# Patient Record
Sex: FEMALE | Race: WHITE | NOT HISPANIC OR LATINO
[De-identification: names, ages, dates, MRNs, and addresses within clinical notes are randomized per-mention and may not be internally consistent; named-entity substitution may affect disease eponyms.]

---

## 2017-05-24 PROBLEM — Z00.00 ENCOUNTER FOR PREVENTIVE HEALTH EXAMINATION: Status: ACTIVE | Noted: 2017-05-24

## 2017-06-13 ENCOUNTER — APPOINTMENT (OUTPATIENT)
Dept: ANTEPARTUM | Facility: CLINIC | Age: 32
End: 2017-06-13

## 2017-06-13 ENCOUNTER — LABORATORY RESULT (OUTPATIENT)
Age: 32
End: 2017-06-13

## 2017-06-13 ENCOUNTER — ASOB RESULT (OUTPATIENT)
Age: 32
End: 2017-06-13

## 2017-08-03 ENCOUNTER — APPOINTMENT (OUTPATIENT)
Dept: ANTEPARTUM | Facility: CLINIC | Age: 32
End: 2017-08-03
Payer: COMMERCIAL

## 2017-08-03 ENCOUNTER — ASOB RESULT (OUTPATIENT)
Age: 32
End: 2017-08-03

## 2017-08-03 PROCEDURE — 76811 OB US DETAILED SNGL FETUS: CPT

## 2017-09-03 ENCOUNTER — TRANSCRIPTION ENCOUNTER (OUTPATIENT)
Age: 32
End: 2017-09-03

## 2017-09-28 ENCOUNTER — ASOB RESULT (OUTPATIENT)
Age: 32
End: 2017-09-28

## 2017-09-28 ENCOUNTER — APPOINTMENT (OUTPATIENT)
Dept: ANTEPARTUM | Facility: CLINIC | Age: 32
End: 2017-09-28
Payer: COMMERCIAL

## 2017-09-28 PROCEDURE — 76817 TRANSVAGINAL US OBSTETRIC: CPT

## 2017-09-28 PROCEDURE — 76816 OB US FOLLOW-UP PER FETUS: CPT

## 2017-10-27 ENCOUNTER — ASOB RESULT (OUTPATIENT)
Age: 32
End: 2017-10-27

## 2017-10-27 ENCOUNTER — APPOINTMENT (OUTPATIENT)
Dept: ANTEPARTUM | Facility: CLINIC | Age: 32
End: 2017-10-27
Payer: COMMERCIAL

## 2017-10-27 PROCEDURE — 76816 OB US FOLLOW-UP PER FETUS: CPT

## 2017-11-30 ENCOUNTER — ASOB RESULT (OUTPATIENT)
Age: 32
End: 2017-11-30

## 2017-11-30 ENCOUNTER — APPOINTMENT (OUTPATIENT)
Dept: ANTEPARTUM | Facility: CLINIC | Age: 32
End: 2017-11-30
Payer: COMMERCIAL

## 2017-11-30 PROCEDURE — 76816 OB US FOLLOW-UP PER FETUS: CPT

## 2017-11-30 PROCEDURE — 76819 FETAL BIOPHYS PROFIL W/O NST: CPT

## 2017-12-20 ENCOUNTER — ASOB RESULT (OUTPATIENT)
Age: 32
End: 2017-12-20

## 2017-12-20 ENCOUNTER — APPOINTMENT (OUTPATIENT)
Dept: ANTEPARTUM | Facility: CLINIC | Age: 32
End: 2017-12-20
Payer: COMMERCIAL

## 2017-12-20 PROCEDURE — 76816 OB US FOLLOW-UP PER FETUS: CPT

## 2017-12-27 ENCOUNTER — OUTPATIENT (OUTPATIENT)
Dept: OUTPATIENT SERVICES | Facility: HOSPITAL | Age: 32
LOS: 1 days | End: 2017-12-27
Payer: COMMERCIAL

## 2017-12-27 ENCOUNTER — APPOINTMENT (OUTPATIENT)
Dept: ANTEPARTUM | Facility: CLINIC | Age: 32
End: 2017-12-27
Payer: COMMERCIAL

## 2017-12-27 ENCOUNTER — INPATIENT (INPATIENT)
Facility: HOSPITAL | Age: 32
LOS: 1 days | Discharge: HOME | End: 2017-12-29
Attending: OBSTETRICS & GYNECOLOGY | Admitting: OBSTETRICS & GYNECOLOGY
Payer: COMMERCIAL

## 2017-12-27 ENCOUNTER — ASOB RESULT (OUTPATIENT)
Age: 32
End: 2017-12-27

## 2017-12-27 VITALS
OXYGEN SATURATION: 98 % | TEMPERATURE: 99 F | SYSTOLIC BLOOD PRESSURE: 113 MMHG | RESPIRATION RATE: 18 BRPM | HEART RATE: 94 BPM | DIASTOLIC BLOOD PRESSURE: 55 MMHG

## 2017-12-27 DIAGNOSIS — Z34.80 ENCOUNTER FOR SUPERVISION OF OTHER NORMAL PREGNANCY, UNSPECIFIED TRIMESTER: ICD-10-CM

## 2017-12-27 DIAGNOSIS — Z3A.00 WEEKS OF GESTATION OF PREGNANCY NOT SPECIFIED: ICD-10-CM

## 2017-12-27 DIAGNOSIS — O26.899 OTHER SPECIFIED PREGNANCY RELATED CONDITIONS, UNSPECIFIED TRIMESTER: ICD-10-CM

## 2017-12-27 DIAGNOSIS — Z01.818 ENCOUNTER FOR OTHER PREPROCEDURAL EXAMINATION: ICD-10-CM

## 2017-12-27 LAB
BASOPHILS # BLD AUTO: 0 K/UL — SIGNIFICANT CHANGE UP (ref 0–0.2)
BASOPHILS NFR BLD AUTO: 0.3 % — SIGNIFICANT CHANGE UP (ref 0–2)
BLD GP AB SCN SERPL QL: NEGATIVE — SIGNIFICANT CHANGE UP
EOSINOPHIL # BLD AUTO: 0.2 K/UL — SIGNIFICANT CHANGE UP (ref 0–0.5)
EOSINOPHIL NFR BLD AUTO: 1.5 % — SIGNIFICANT CHANGE UP (ref 0–6)
GLUCOSE BLDC GLUCOMTR-MCNC: 143 MG/DL — HIGH (ref 70–99)
GLUCOSE BLDC GLUCOMTR-MCNC: 82 MG/DL — SIGNIFICANT CHANGE UP (ref 70–99)
GLUCOSE BLDC GLUCOMTR-MCNC: 98 MG/DL — SIGNIFICANT CHANGE UP (ref 70–99)
HCT VFR BLD CALC: 40.5 % — SIGNIFICANT CHANGE UP (ref 34.5–45)
HGB BLD-MCNC: 13.9 G/DL — SIGNIFICANT CHANGE UP (ref 11.5–15.5)
LYMPHOCYTES # BLD AUTO: 18 % — SIGNIFICANT CHANGE UP (ref 13–44)
LYMPHOCYTES # BLD AUTO: 2.2 K/UL — SIGNIFICANT CHANGE UP (ref 1–3.3)
MCHC RBC-ENTMCNC: 31 PG — SIGNIFICANT CHANGE UP (ref 27–34)
MCHC RBC-ENTMCNC: 34.4 GM/DL — SIGNIFICANT CHANGE UP (ref 32–36)
MCV RBC AUTO: 90.2 FL — SIGNIFICANT CHANGE UP (ref 80–100)
MONOCYTES # BLD AUTO: 0.6 K/UL — SIGNIFICANT CHANGE UP (ref 0–0.9)
MONOCYTES NFR BLD AUTO: 5 % — SIGNIFICANT CHANGE UP (ref 2–14)
NEUTROPHILS # BLD AUTO: 9.2 K/UL — HIGH (ref 1.8–7.4)
NEUTROPHILS NFR BLD AUTO: 75.2 % — SIGNIFICANT CHANGE UP (ref 43–77)
PLATELET # BLD AUTO: 220 K/UL — SIGNIFICANT CHANGE UP (ref 150–400)
RBC # BLD: 4.48 M/UL — SIGNIFICANT CHANGE UP (ref 3.8–5.2)
RBC # FLD: 12.1 % — SIGNIFICANT CHANGE UP (ref 10.3–14.5)
RH IG SCN BLD-IMP: NEGATIVE — SIGNIFICANT CHANGE UP
RH IG SCN BLD-IMP: NEGATIVE — SIGNIFICANT CHANGE UP
T PALLIDUM AB TITR SER: NEGATIVE — SIGNIFICANT CHANGE UP
WBC # BLD: 12.2 K/UL — HIGH (ref 3.8–10.5)
WBC # FLD AUTO: 12.2 K/UL — HIGH (ref 3.8–10.5)

## 2017-12-27 PROCEDURE — 59025 FETAL NON-STRESS TEST: CPT

## 2017-12-27 PROCEDURE — 59025 FETAL NON-STRESS TEST: CPT | Mod: 26

## 2017-12-27 RX ORDER — DIBUCAINE 1 %
1 OINTMENT (GRAM) RECTAL EVERY 4 HOURS
Qty: 0 | Refills: 0 | Status: DISCONTINUED | OUTPATIENT
Start: 2017-12-27 | End: 2017-12-27

## 2017-12-27 RX ORDER — IBUPROFEN 200 MG
600 TABLET ORAL EVERY 6 HOURS
Qty: 0 | Refills: 0 | Status: COMPLETED | OUTPATIENT
Start: 2017-12-27 | End: 2018-11-25

## 2017-12-27 RX ORDER — OXYTOCIN 10 UNIT/ML
333.33 VIAL (ML) INJECTION
Qty: 20 | Refills: 0 | Status: DISCONTINUED | OUTPATIENT
Start: 2017-12-27 | End: 2017-12-27

## 2017-12-27 RX ORDER — SODIUM CHLORIDE 9 MG/ML
1000 INJECTION INTRAMUSCULAR; INTRAVENOUS; SUBCUTANEOUS
Qty: 0 | Refills: 0 | Status: DISCONTINUED | OUTPATIENT
Start: 2017-12-27 | End: 2017-12-27

## 2017-12-27 RX ORDER — DIPHENHYDRAMINE HCL 50 MG
25 CAPSULE ORAL EVERY 6 HOURS
Qty: 0 | Refills: 0 | Status: DISCONTINUED | OUTPATIENT
Start: 2017-12-27 | End: 2017-12-29

## 2017-12-27 RX ORDER — HYDROCORTISONE 1 %
1 OINTMENT (GRAM) TOPICAL EVERY 4 HOURS
Qty: 0 | Refills: 0 | Status: DISCONTINUED | OUTPATIENT
Start: 2017-12-27 | End: 2017-12-29

## 2017-12-27 RX ORDER — LANOLIN
1 OINTMENT (GRAM) TOPICAL EVERY 6 HOURS
Qty: 0 | Refills: 0 | Status: DISCONTINUED | OUTPATIENT
Start: 2017-12-27 | End: 2017-12-29

## 2017-12-27 RX ORDER — SODIUM CHLORIDE 9 MG/ML
3 INJECTION INTRAMUSCULAR; INTRAVENOUS; SUBCUTANEOUS EVERY 8 HOURS
Qty: 0 | Refills: 0 | Status: DISCONTINUED | OUTPATIENT
Start: 2017-12-27 | End: 2017-12-27

## 2017-12-27 RX ORDER — PRAMOXINE HYDROCHLORIDE 150 MG/15G
1 AEROSOL, FOAM RECTAL EVERY 4 HOURS
Qty: 0 | Refills: 0 | Status: DISCONTINUED | OUTPATIENT
Start: 2017-12-27 | End: 2017-12-27

## 2017-12-27 RX ORDER — SODIUM CHLORIDE 9 MG/ML
500 INJECTION, SOLUTION INTRAVENOUS ONCE
Qty: 0 | Refills: 0 | Status: DISCONTINUED | OUTPATIENT
Start: 2017-12-27 | End: 2017-12-27

## 2017-12-27 RX ORDER — SODIUM CHLORIDE 9 MG/ML
500 INJECTION INTRAMUSCULAR; INTRAVENOUS; SUBCUTANEOUS
Qty: 0 | Refills: 0 | Status: DISCONTINUED | OUTPATIENT
Start: 2017-12-27 | End: 2017-12-27

## 2017-12-27 RX ORDER — PRAMOXINE HYDROCHLORIDE 150 MG/15G
1 AEROSOL, FOAM RECTAL EVERY 4 HOURS
Qty: 0 | Refills: 0 | Status: DISCONTINUED | OUTPATIENT
Start: 2017-12-27 | End: 2017-12-29

## 2017-12-27 RX ORDER — OXYTOCIN 10 UNIT/ML
41.67 VIAL (ML) INJECTION
Qty: 20 | Refills: 0 | Status: DISCONTINUED | OUTPATIENT
Start: 2017-12-27 | End: 2017-12-29

## 2017-12-27 RX ORDER — MAGNESIUM HYDROXIDE 400 MG/1
30 TABLET, CHEWABLE ORAL
Qty: 0 | Refills: 0 | Status: DISCONTINUED | OUTPATIENT
Start: 2017-12-27 | End: 2017-12-29

## 2017-12-27 RX ORDER — GLYCERIN ADULT
1 SUPPOSITORY, RECTAL RECTAL AT BEDTIME
Qty: 0 | Refills: 0 | Status: DISCONTINUED | OUTPATIENT
Start: 2017-12-27 | End: 2017-12-29

## 2017-12-27 RX ORDER — ACETAMINOPHEN 500 MG
975 TABLET ORAL EVERY 6 HOURS
Qty: 0 | Refills: 0 | Status: COMPLETED | OUTPATIENT
Start: 2017-12-27 | End: 2018-11-25

## 2017-12-27 RX ORDER — AER TRAVELER 0.5 G/1
1 SOLUTION RECTAL; TOPICAL EVERY 4 HOURS
Qty: 0 | Refills: 0 | Status: DISCONTINUED | OUTPATIENT
Start: 2017-12-27 | End: 2017-12-27

## 2017-12-27 RX ORDER — SODIUM CHLORIDE 9 MG/ML
1000 INJECTION, SOLUTION INTRAVENOUS
Qty: 0 | Refills: 0 | Status: DISCONTINUED | OUTPATIENT
Start: 2017-12-27 | End: 2017-12-27

## 2017-12-27 RX ORDER — INFLUENZA VIRUS VACCINE 15; 15; 15; 15 UG/.5ML; UG/.5ML; UG/.5ML; UG/.5ML
0.5 SUSPENSION INTRAMUSCULAR ONCE
Qty: 0 | Refills: 0 | Status: DISCONTINUED | OUTPATIENT
Start: 2017-12-27 | End: 2017-12-27

## 2017-12-27 RX ORDER — OXYTOCIN 10 UNIT/ML
4 VIAL (ML) INJECTION
Qty: 30 | Refills: 0 | Status: DISCONTINUED | OUTPATIENT
Start: 2017-12-27 | End: 2017-12-29

## 2017-12-27 RX ORDER — AER TRAVELER 0.5 G/1
1 SOLUTION RECTAL; TOPICAL EVERY 4 HOURS
Qty: 0 | Refills: 0 | Status: DISCONTINUED | OUTPATIENT
Start: 2017-12-27 | End: 2017-12-29

## 2017-12-27 RX ORDER — DIBUCAINE 1 %
1 OINTMENT (GRAM) RECTAL EVERY 4 HOURS
Qty: 0 | Refills: 0 | Status: DISCONTINUED | OUTPATIENT
Start: 2017-12-27 | End: 2017-12-29

## 2017-12-27 RX ORDER — SIMETHICONE 80 MG/1
80 TABLET, CHEWABLE ORAL EVERY 6 HOURS
Qty: 0 | Refills: 0 | Status: DISCONTINUED | OUTPATIENT
Start: 2017-12-27 | End: 2017-12-29

## 2017-12-27 RX ORDER — OXYTOCIN 10 UNIT/ML
41.67 VIAL (ML) INJECTION
Qty: 20 | Refills: 0 | Status: DISCONTINUED | OUTPATIENT
Start: 2017-12-27 | End: 2017-12-27

## 2017-12-27 RX ORDER — TETANUS TOXOID, REDUCED DIPHTHERIA TOXOID AND ACELLULAR PERTUSSIS VACCINE, ADSORBED 5; 2.5; 8; 8; 2.5 [IU]/.5ML; [IU]/.5ML; UG/.5ML; UG/.5ML; UG/.5ML
0.5 SUSPENSION INTRAMUSCULAR ONCE
Qty: 0 | Refills: 0 | Status: DISCONTINUED | OUTPATIENT
Start: 2017-12-27 | End: 2017-12-29

## 2017-12-27 RX ORDER — SODIUM CHLORIDE 9 MG/ML
3 INJECTION INTRAMUSCULAR; INTRAVENOUS; SUBCUTANEOUS EVERY 8 HOURS
Qty: 0 | Refills: 0 | Status: DISCONTINUED | OUTPATIENT
Start: 2017-12-27 | End: 2017-12-29

## 2017-12-27 RX ORDER — DOCUSATE SODIUM 100 MG
100 CAPSULE ORAL
Qty: 0 | Refills: 0 | Status: DISCONTINUED | OUTPATIENT
Start: 2017-12-27 | End: 2017-12-29

## 2017-12-27 RX ORDER — OXYCODONE HYDROCHLORIDE 5 MG/1
5 TABLET ORAL EVERY 4 HOURS
Qty: 0 | Refills: 0 | Status: DISCONTINUED | OUTPATIENT
Start: 2017-12-27 | End: 2017-12-29

## 2017-12-27 RX ORDER — HYDROCORTISONE 1 %
1 OINTMENT (GRAM) TOPICAL EVERY 4 HOURS
Qty: 0 | Refills: 0 | Status: DISCONTINUED | OUTPATIENT
Start: 2017-12-27 | End: 2017-12-27

## 2017-12-27 RX ORDER — OXYCODONE HYDROCHLORIDE 5 MG/1
5 TABLET ORAL
Qty: 0 | Refills: 0 | Status: DISCONTINUED | OUTPATIENT
Start: 2017-12-27 | End: 2017-12-29

## 2017-12-27 RX ORDER — KETOROLAC TROMETHAMINE 30 MG/ML
30 SYRINGE (ML) INJECTION ONCE
Qty: 0 | Refills: 0 | Status: DISCONTINUED | OUTPATIENT
Start: 2017-12-27 | End: 2017-12-27

## 2017-12-27 RX ORDER — CITRIC ACID/SODIUM CITRATE 300-500 MG
15 SOLUTION, ORAL ORAL EVERY 4 HOURS
Qty: 0 | Refills: 0 | Status: DISCONTINUED | OUTPATIENT
Start: 2017-12-27 | End: 2017-12-27

## 2017-12-27 RX ORDER — OXYTOCIN 10 UNIT/ML
4 VIAL (ML) INJECTION
Qty: 30 | Refills: 0 | Status: DISCONTINUED | OUTPATIENT
Start: 2017-12-27 | End: 2017-12-27

## 2017-12-27 RX ADMIN — Medication 30 MILLIGRAM(S): at 18:08

## 2017-12-27 RX ADMIN — Medication 4 MILLIUNIT(S)/MIN: at 12:10

## 2017-12-27 RX ADMIN — Medication 4 MILLIUNIT(S)/MIN: at 11:19

## 2017-12-27 RX ADMIN — SODIUM CHLORIDE 3 MILLILITER(S): 9 INJECTION INTRAMUSCULAR; INTRAVENOUS; SUBCUTANEOUS at 22:00

## 2017-12-27 RX ADMIN — Medication 30 MILLIGRAM(S): at 17:42

## 2017-12-28 LAB
HCT VFR BLD CALC: 32 % — LOW (ref 34.5–45)
HGB BLD-MCNC: 10.9 G/DL — LOW (ref 11.5–15.5)
KLEIHAUER-BETKE CALCULATION: 0 % — SIGNIFICANT CHANGE UP (ref 0–0.3)

## 2017-12-28 RX ORDER — ACETAMINOPHEN 500 MG
975 TABLET ORAL EVERY 6 HOURS
Qty: 0 | Refills: 0 | Status: DISCONTINUED | OUTPATIENT
Start: 2017-12-28 | End: 2017-12-29

## 2017-12-28 RX ORDER — IBUPROFEN 200 MG
600 TABLET ORAL EVERY 6 HOURS
Qty: 0 | Refills: 0 | Status: DISCONTINUED | OUTPATIENT
Start: 2017-12-28 | End: 2017-12-29

## 2017-12-28 RX ADMIN — Medication 600 MILLIGRAM(S): at 17:04

## 2017-12-28 RX ADMIN — Medication 600 MILLIGRAM(S): at 06:05

## 2017-12-28 RX ADMIN — Medication 1 TABLET(S): at 13:33

## 2017-12-28 RX ADMIN — Medication 100 MILLIGRAM(S): at 06:05

## 2017-12-28 RX ADMIN — Medication 600 MILLIGRAM(S): at 07:00

## 2017-12-28 RX ADMIN — Medication 975 MILLIGRAM(S): at 17:05

## 2017-12-28 RX ADMIN — Medication 975 MILLIGRAM(S): at 06:04

## 2017-12-28 RX ADMIN — Medication 975 MILLIGRAM(S): at 16:35

## 2017-12-28 RX ADMIN — Medication 975 MILLIGRAM(S): at 07:00

## 2017-12-28 RX ADMIN — Medication 100 MILLIGRAM(S): at 17:55

## 2017-12-28 RX ADMIN — Medication 600 MILLIGRAM(S): at 16:34

## 2017-12-28 NOTE — DIETITIAN INITIAL EVALUATION ADULT. - OTHER INFO
Nutrition consult received for GDM after delivery nutrition education. Pt is GDMA1 S/P . Pt reports good appetite/intake of regular diet at this time. Denies GI distress. NKFA. Reports taking prenatal MVI PTA. Pt with plans to breastfeed exclusively. Pt's questions regarding diet addressed. Nutrition education provided and noted below.

## 2017-12-28 NOTE — DIETITIAN INITIAL EVALUATION ADULT. - NS AS NUTRI INTERV ED CONTENT
Pt educated on postpartum dietary recommendations, including importance of DM fasting blood glucose screening 4-12 weeks postpartum, risk of developing T2DM, ways to reduce risk, importance of prenatal MVI while breastfeeding, and increased nutrient needs for lactation. Pt verbalized understanding and accepted written handout.

## 2017-12-28 NOTE — PROGRESS NOTE ADULT - SUBJECTIVE AND OBJECTIVE BOX
PPD#1- ATTENDING NOTE    S: Patient doing well. Minimal lochia. Pain controlled.    O: Vital Signs Last 24 Hrs  T(C): 36.9 (28 Dec 2017 09:19), Max: 37.1 (27 Dec 2017 17:25)  T(F): 98.4 (28 Dec 2017 09:19), Max: 98.8 (27 Dec 2017 17:25)  HR: 96 (28 Dec 2017 09:19) (91 - 120)  BP: 115/79 (28 Dec 2017 09:19) (112/72 - 133/74)  BP(mean): --  RR: 18 (28 Dec 2017 09:19) (17 - 18)  SpO2: 97% (28 Dec 2017 09:19) (97% - 99%)    Gen: NAD  Abd: soft, NT, ND, fundus firm below umbilicus  Lochia: moderate  Ext: no tenderness, no hyper reflexia,     Labs:                            10.9   x     )-----------( x        ( 28 Dec 2017 06:39 )             32.0       A: 32y PPD# s/p  doing well.    Plan:  Analgesia prn  Regular diet  Follow up am labs  Routine post partum care

## 2017-12-29 ENCOUNTER — TRANSCRIPTION ENCOUNTER (OUTPATIENT)
Age: 32
End: 2017-12-29

## 2017-12-29 VITALS
DIASTOLIC BLOOD PRESSURE: 78 MMHG | HEART RATE: 89 BPM | TEMPERATURE: 98 F | OXYGEN SATURATION: 98 % | RESPIRATION RATE: 18 BRPM | SYSTOLIC BLOOD PRESSURE: 112 MMHG

## 2017-12-29 PROCEDURE — 86901 BLOOD TYPING SEROLOGIC RH(D): CPT

## 2017-12-29 PROCEDURE — 86780 TREPONEMA PALLIDUM: CPT

## 2017-12-29 PROCEDURE — 82962 GLUCOSE BLOOD TEST: CPT

## 2017-12-29 PROCEDURE — 59050 FETAL MONITOR W/REPORT: CPT

## 2017-12-29 PROCEDURE — 86900 BLOOD TYPING SEROLOGIC ABO: CPT

## 2017-12-29 PROCEDURE — 86850 RBC ANTIBODY SCREEN: CPT

## 2017-12-29 PROCEDURE — 85460 HEMOGLOBIN FETAL: CPT

## 2017-12-29 PROCEDURE — 59025 FETAL NON-STRESS TEST: CPT

## 2017-12-29 PROCEDURE — 85027 COMPLETE CBC AUTOMATED: CPT

## 2017-12-29 PROCEDURE — G0463: CPT

## 2017-12-29 PROCEDURE — 85018 HEMOGLOBIN: CPT

## 2017-12-29 RX ORDER — IBUPROFEN 200 MG
1 TABLET ORAL
Qty: 0 | Refills: 0 | DISCHARGE
Start: 2017-12-29

## 2017-12-29 RX ORDER — ACETAMINOPHEN 500 MG
3 TABLET ORAL
Qty: 0 | Refills: 0 | DISCHARGE
Start: 2017-12-29

## 2017-12-29 RX ADMIN — Medication 975 MILLIGRAM(S): at 11:00

## 2017-12-29 RX ADMIN — Medication 600 MILLIGRAM(S): at 10:27

## 2017-12-29 RX ADMIN — Medication 1 TABLET(S): at 11:54

## 2017-12-29 RX ADMIN — Medication 600 MILLIGRAM(S): at 11:00

## 2017-12-29 RX ADMIN — Medication 975 MILLIGRAM(S): at 10:27

## 2017-12-29 NOTE — DISCHARGE NOTE OB - PATIENT PORTAL LINK FT
“You can access the FollowHealth Patient Portal, offered by [facility], by registering with the following website: [email or web address]”

## 2017-12-29 NOTE — DISCHARGE NOTE OB - MATERIALS PROVIDED
Immunization Record/[facility] [place] Screening Program/Breastfeeding Guide and Packet/Breastfeeding Log/Back To Sleep Handout/Birth Certificate Instructions/Discharge Medication Information for Patients and Families Pocket Guide/Vaccinations/[facility] Hearing Screen Program/Shaken Baby Prevention Handout/Breastfeeding Mother’s Support Group Information/Guide to Postpartum Care

## 2017-12-29 NOTE — PROGRESS NOTE ADULT - SUBJECTIVE AND OBJECTIVE BOX
PPD#2    S: Patient doing well. Minimal lochia. Pain controlled.    O: Vital Signs Last 24 Hrs  T(C): 36.9 (29 Dec 2017 05:00), Max: 37.3 (28 Dec 2017 17:02)  T(F): 98.4 (29 Dec 2017 05:00), Max: 99.1 (28 Dec 2017 17:02)  HR: 98 (29 Dec 2017 05:00) (97 - 98)  BP: 125/75 (29 Dec 2017 05:00) (125/72 - 132/83)  BP(mean): --  RR: 18 (29 Dec 2017 05:00) (18 - 18)  SpO2: 98% (28 Dec 2017 12:53) (98% - 98%)    Gen: NAD  Abd: soft, NT, ND, fundus firm below umbilicus  Lochia: moderate  Ext: no tenderness    Labs:                        10.9   x     )-----------( x        ( 28 Dec 2017 06:39 )             32.0       A: 32y PPD#2 s/p  doing well.    Plan:  Analgesia prn  Regular diet  Discharge instructions given

## 2017-12-29 NOTE — DISCHARGE NOTE OB - CARE PROVIDER_API CALL
Noe Wilson (MD), Obstetrics  Gynecology  [address] at the [facility] [place] [address]  Phone: [phone]  Fax: [phone]

## 2018-01-02 DIAGNOSIS — O48.0 POST-TERM PREGNANCY: ICD-10-CM

## 2018-12-04 ENCOUNTER — RESULT REVIEW (OUTPATIENT)
Age: 33
End: 2018-12-04

## 2018-12-04 ENCOUNTER — INPATIENT (INPATIENT)
Facility: HOSPITAL | Age: 33
LOS: 0 days | Discharge: HOME | DRG: 817 | End: 2018-12-05
Attending: OBSTETRICS & GYNECOLOGY | Admitting: OBSTETRICS & GYNECOLOGY
Payer: COMMERCIAL

## 2018-12-04 ENCOUNTER — TRANSCRIPTION ENCOUNTER (OUTPATIENT)
Age: 33
End: 2018-12-04

## 2018-12-04 VITALS
TEMPERATURE: 98 F | HEART RATE: 77 BPM | HEIGHT: 63 IN | SYSTOLIC BLOOD PRESSURE: 134 MMHG | RESPIRATION RATE: 18 BRPM | OXYGEN SATURATION: 100 % | DIASTOLIC BLOOD PRESSURE: 59 MMHG | WEIGHT: 186.95 LBS

## 2018-12-04 DIAGNOSIS — O00.90 UNSPECIFIED ECTOPIC PREGNANCY WITHOUT INTRAUTERINE PREGNANCY: ICD-10-CM

## 2018-12-04 LAB
ALBUMIN SERPL ELPH-MCNC: 4.4 G/DL — SIGNIFICANT CHANGE UP (ref 3.3–5)
ALP SERPL-CCNC: 89 U/L — SIGNIFICANT CHANGE UP (ref 40–120)
ALT FLD-CCNC: 7 U/L — LOW (ref 10–45)
ANION GAP SERPL CALC-SCNC: 15 MMOL/L — SIGNIFICANT CHANGE UP (ref 5–17)
APPEARANCE UR: CLEAR — SIGNIFICANT CHANGE UP
APTT BLD: 30.1 SEC — SIGNIFICANT CHANGE UP (ref 27.5–36.3)
AST SERPL-CCNC: 18 U/L — SIGNIFICANT CHANGE UP (ref 10–40)
BACTERIA # UR AUTO: ABNORMAL
BASOPHILS # BLD AUTO: 0.1 K/UL — SIGNIFICANT CHANGE UP (ref 0–0.2)
BASOPHILS NFR BLD AUTO: 0.7 % — SIGNIFICANT CHANGE UP (ref 0–2)
BILIRUB SERPL-MCNC: 0.2 MG/DL — SIGNIFICANT CHANGE UP (ref 0.2–1.2)
BILIRUB UR-MCNC: NEGATIVE — SIGNIFICANT CHANGE UP
BLD GP AB SCN SERPL QL: NEGATIVE — SIGNIFICANT CHANGE UP
BUN SERPL-MCNC: 12 MG/DL — SIGNIFICANT CHANGE UP (ref 7–23)
CALCIUM SERPL-MCNC: 9 MG/DL — SIGNIFICANT CHANGE UP (ref 8.4–10.5)
CHLORIDE SERPL-SCNC: 103 MMOL/L — SIGNIFICANT CHANGE UP (ref 96–108)
CO2 SERPL-SCNC: 22 MMOL/L — SIGNIFICANT CHANGE UP (ref 22–31)
COLOR SPEC: SIGNIFICANT CHANGE UP
CREAT SERPL-MCNC: 0.71 MG/DL — SIGNIFICANT CHANGE UP (ref 0.5–1.3)
DIFF PNL FLD: ABNORMAL
EOSINOPHIL # BLD AUTO: 0.4 K/UL — SIGNIFICANT CHANGE UP (ref 0–0.5)
EOSINOPHIL NFR BLD AUTO: 4.1 % — SIGNIFICANT CHANGE UP (ref 0–6)
EPI CELLS # UR: 2 /HPF — SIGNIFICANT CHANGE UP
GLUCOSE SERPL-MCNC: 88 MG/DL — SIGNIFICANT CHANGE UP (ref 70–99)
GLUCOSE UR QL: NEGATIVE — SIGNIFICANT CHANGE UP
HCG SERPL-ACNC: 28.6 MIU/ML — HIGH
HCG UR QL: NEGATIVE — SIGNIFICANT CHANGE UP
HCT VFR BLD CALC: 36.8 % — SIGNIFICANT CHANGE UP (ref 34.5–45)
HGB BLD-MCNC: 12.5 G/DL — SIGNIFICANT CHANGE UP (ref 11.5–15.5)
HYALINE CASTS # UR AUTO: 0 /LPF — SIGNIFICANT CHANGE UP (ref 0–2)
INR BLD: 0.95 RATIO — SIGNIFICANT CHANGE UP (ref 0.88–1.16)
KETONES UR-MCNC: NEGATIVE — SIGNIFICANT CHANGE UP
LEUKOCYTE ESTERASE UR-ACNC: NEGATIVE — SIGNIFICANT CHANGE UP
LYMPHOCYTES # BLD AUTO: 3.1 K/UL — SIGNIFICANT CHANGE UP (ref 1–3.3)
LYMPHOCYTES # BLD AUTO: 34.4 % — SIGNIFICANT CHANGE UP (ref 13–44)
MCHC RBC-ENTMCNC: 29.1 PG — SIGNIFICANT CHANGE UP (ref 27–34)
MCHC RBC-ENTMCNC: 34.1 GM/DL — SIGNIFICANT CHANGE UP (ref 32–36)
MCV RBC AUTO: 85.3 FL — SIGNIFICANT CHANGE UP (ref 80–100)
MONOCYTES # BLD AUTO: 0.5 K/UL — SIGNIFICANT CHANGE UP (ref 0–0.9)
MONOCYTES NFR BLD AUTO: 6 % — SIGNIFICANT CHANGE UP (ref 2–14)
NEUTROPHILS # BLD AUTO: 5 K/UL — SIGNIFICANT CHANGE UP (ref 1.8–7.4)
NEUTROPHILS NFR BLD AUTO: 54.7 % — SIGNIFICANT CHANGE UP (ref 43–77)
NITRITE UR-MCNC: NEGATIVE — SIGNIFICANT CHANGE UP
PH UR: 8 — SIGNIFICANT CHANGE UP (ref 5–8)
PLATELET # BLD AUTO: 257 K/UL — SIGNIFICANT CHANGE UP (ref 150–400)
POTASSIUM SERPL-MCNC: 4.1 MMOL/L — SIGNIFICANT CHANGE UP (ref 3.5–5.3)
POTASSIUM SERPL-SCNC: 4.1 MMOL/L — SIGNIFICANT CHANGE UP (ref 3.5–5.3)
PROT SERPL-MCNC: 7.6 G/DL — SIGNIFICANT CHANGE UP (ref 6–8.3)
PROT UR-MCNC: SIGNIFICANT CHANGE UP
PROTHROM AB SERPL-ACNC: 10.9 SEC — SIGNIFICANT CHANGE UP (ref 10–12.9)
RBC # BLD: 4.31 M/UL — SIGNIFICANT CHANGE UP (ref 3.8–5.2)
RBC # FLD: 12.8 % — SIGNIFICANT CHANGE UP (ref 10.3–14.5)
RBC CASTS # UR COMP ASSIST: 10 /HPF — HIGH (ref 0–4)
RH IG SCN BLD-IMP: NEGATIVE — SIGNIFICANT CHANGE UP
SODIUM SERPL-SCNC: 140 MMOL/L — SIGNIFICANT CHANGE UP (ref 135–145)
SP GR SPEC: 1.02 — SIGNIFICANT CHANGE UP (ref 1.01–1.02)
UROBILINOGEN FLD QL: NEGATIVE — SIGNIFICANT CHANGE UP
WBC # BLD: 9.1 K/UL — SIGNIFICANT CHANGE UP (ref 3.8–10.5)
WBC # FLD AUTO: 9.1 K/UL — SIGNIFICANT CHANGE UP (ref 3.8–10.5)
WBC UR QL: 2 /HPF — SIGNIFICANT CHANGE UP (ref 0–5)

## 2018-12-04 PROCEDURE — 93975 VASCULAR STUDY: CPT | Mod: 26

## 2018-12-04 PROCEDURE — 76817 TRANSVAGINAL US OBSTETRIC: CPT | Mod: 26

## 2018-12-04 PROCEDURE — 99285 EMERGENCY DEPT VISIT HI MDM: CPT

## 2018-12-04 RX ORDER — SODIUM CHLORIDE 9 MG/ML
1000 INJECTION, SOLUTION INTRAVENOUS
Qty: 0 | Refills: 0 | Status: DISCONTINUED | OUTPATIENT
Start: 2018-12-04 | End: 2018-12-05

## 2018-12-04 RX ADMIN — SODIUM CHLORIDE 125 MILLILITER(S): 9 INJECTION, SOLUTION INTRAVENOUS at 23:59

## 2018-12-04 NOTE — ED PROVIDER NOTE - OBJECTIVE STATEMENT
34 y/o female  who presents to the ED to r/o left sided ectopic pregnancy.  the patient went to pmd last week for some epigastric discomfort and was advised after bloodwork she was pregnant with an hcg of 145. she did have vaginal bleeding at that time which has slowed to light spotting since. additionally her epigastric discomfort has migrated to the LLQ and she states when she has exacerbations of the pain they shoot up her back into her neck. no fever/chills/n/v/d.

## 2018-12-04 NOTE — ED ADULT TRIAGE NOTE - CHIEF COMPLAINT QUOTE
Patient presents with lower abdominal pain x 1 week. Patient sent over for r/o ectopic pregnancy. Patient states she had vaginal bleeding several days ago but is not currently.

## 2018-12-04 NOTE — H&P ADULT - HISTORY OF PRESENT ILLNESS
Patient is 34yo  LMP 10/27/18 referred to ED by OB Dr. Inman for concern for ruptured ectopic pregnancy. Patient notes that pain started on  as cramping pain associated with vaginal spotting. Pt seen for pain by PCP and found to have bhcg 141 on , pt not informed of results until . Intially pain was located near umbilicus as dull diffuse pain.  Pain moved to LLQ and was accompanied by vaginal bleeding 3ppd from -. Currently vaginal bleeding is resolved however pt continues to have dull, nonradiating LLQ pain 4-5/10. Pain worse with movement and improved with laying still. Pain is assocated with R shoulder pain since . Patient has/has never had this type of pain before. She denies chest pain, shortness of breath, nausea, vomiting, fevers, and chills. Patient denies current vaginal bleeding, abnormal vaginal discharge, and spotting. Patient denies dysuria, hematuria, back pain and urinary frequency. Patient is passing flatus and having formed bowel movements. She denies constipation and diarrhea. Patient last ate at 2pm    POb:  status post uncomplicated  x2  and     PGyn: denies h/o AUB, fibroids, ovarian cysts, PID, GC/CL, HIV, Hepatitis, abnormal PAPs.   Menarche 10yo with regular menses q28d, bleeding 5d,. 3ppd  Sexually active with 1 partner currently without contraception or protection. 8 lifetime partners.     Meds: denies      All: NKDA    Social: denies history smoking cigarettes, alcohol dependence, illicit drug use

## 2018-12-04 NOTE — H&P ADULT - NSHPLABSRESULTS_GEN_ALL_CORE
< from: US Echo Transvaginal, OB (12.04.18 @ 20:46) >      EXAM:  US OB TRANSVAGINAL                            HCG Quantitative, Serum (12.04.18 @ 20:12)    HCG Quantitative, Serum: 28.6: HCG-Quantitative test interpretations: This test is intended only for the  detection & monitoring of pregnancy. For oncology studies order the tumor  marker test “HCG-TM” (hCG-Tumor Marker).  For pregnancy evaluation the reference values are as follows:  Negative:  <=4 mIU/mL  Indeterminate:  5 - 25 mIU/mL (suggest repeat testing in 72 hours)  Positive:  > 25 mIU/mL                            12.5   9.1   )-----------( 257      ( 04 Dec 2018 20:12 )             36.8       Blood Typing (ABO + Rho D) (12.27.17 @ 12:47)    Rh Interpretation: Negative    ABO Interpretation: B      EXAM:  US DPLX PELVIC                            PROCEDURE DATE:  12/04/2018        INTERPRETATION:  CLINICAL INFORMATION: Vaginal bleeding. Serum beta hCG   of 28.6; it was measured at over 100 in outpatient office last week.    LMP: 10/20/2018    Estimated Gestational Age by LMP: 6 weeks 4 days    COMPARISON: None available.    TECHNIQUE: Endovaginal pelvic sonogram per the ordering provider's   request. Abdominal sonography was performedfor institution's protocol.   Color and spectral Doppler imaging were performed.    FINDINGS:    Uterus: 7.5 x 3.7 x 4.5 cm.    Endometrium: 4 mm. Within normal limits. No intrauterine gestational sac   identified.    Right ovary: 3.1 x 2.3 x 2.1 cm.Within normal limits. Preserved blood   flow on color and spectral Doppler imaging.    Left ovary: 2.7 x 1.5 x 1.5 cm. Within normal limits. Preserved blood   flow on color and spectral Doppler imaging.    Fluid: Moderate amount of free fluid with mild complexity, most   prominently within the left adnexa.    IMPRESSION:    No intrauterine or extrauterine gestational sac identified. Moderate   amount of complex free fluid, particularly within the left adnexa.   Ruptured occult ectopic pregnancy should be considered. The above   findings were discussed with TRESSA Harrison by Dr. Son on 12/4/2018 8:58   PM, with read-back verification.      < end of copied text >                  GUDELIA SON M.D., RADIOLOGY RESIDENT  This document has been electronically signed.  HARVEY RYAN M.D., ATTENDING RADIOLOGIST  This document has been electronically signed. Dec  4 2018  9:06PM

## 2018-12-04 NOTE — H&P ADULT - NSHPPHYSICALEXAM_GEN_ALL_CORE
Gen - NAD   CV - RRR, s1 and s2 noted   Resp - CTAB   Abd - soft, nondistended, LLQ tenderness, +rebound LLQ, no guarding   Pelvic - no blood in vault, cervix without bleeding. +CMT. +L adnexal tenderness   Ext - NTBL

## 2018-12-04 NOTE — ED PROVIDER NOTE - MEDICAL DECISION MAKING DETAILS
Pt with lower left abdominal pain had recent missed period and out patient pelvic US suggested probably left ectopic pregnancy no n/v no syncope Abdomen soft tender LLQ mild rebound+ minimal vaginal bleeding OS closed Heart and Lungs wnl concern for left ectopic labs repeat pelvic OB eval US re eval in ED --Romano

## 2018-12-04 NOTE — ED PROVIDER NOTE - ATTENDING CONTRIBUTION TO CARE
I have seen and evaluated this patient with the advance practice clinician.   I agree with the findings  unless other wise stated. I have amended notes where needed.  After my face to face bedside evaluation, I am noting: Pt with lower left abdominal pain had recent missed period and out patient pelvic US suggested probably left ectopic pregnancy no n/v no syncope Abdomen soft tender LLQ mild rebound+ minimal vaginal bleeding OS closed Heart and Lungs wnl concern for left ectopic labs repeat pelvic OB eval US re eval in ED Pt has collection of complex fluid in pelvis likely ruptured ectopic will admit to GYN --Romano

## 2018-12-04 NOTE — H&P ADULT - PROBLEM SELECTOR PLAN 1
Plan OR:   Neuro: IV pain medication prn  CV: Patient hemodynamically stable- will continue to monitor vitals closely.   Pulm: saturating well on room air   GI: NPO for OR   : Cano to be placed intra-operatively.   Reproductive: -Ruptured ectopic pregnancy: patient to go to OR for diagnostic laparoscopy, unilateral salpingectomy, possible unilateral oopherectomy, possible exploratory laparotomy.  Patient counseled on risks of surgery including bleeding, infection and damage to surrounding organs.  All questions/concerns of patient addressed. All consents signed with patient.    Heme: SCD's in OR for DVT ppx.  Aggressive and early ambulation post-operatively for DVT ppx.   ID: afebrile   FEN: LR@125.  Replete electrolytes prn   Dispo: To OR for procedure as detailed above    LETA Kohler PGY2  pt seen and d/w Dr. Casillas

## 2018-12-04 NOTE — H&P ADULT - ASSESSMENT
33y  sent in from OBGYN office for concern for ruptured ectopic pregnancy. TVUS consistant with hemoperitoneum with fluid in L adnexa in setting of +HCG concerning for ruptured ectopic pregnancy.    Patient is clinically and hemodynamically stable however with tenderness on exam to palpation of LLQ.    Given symptom of abdominal pain and tenderness on exam, patient is not an appropriate candidate for methotrexate therapy.  Will prepare patient for OR for diagnostic laparoscopy and unilateral salpingectomy.

## 2018-12-04 NOTE — ED ADULT NURSE NOTE - NSIMPLEMENTINTERV_GEN_ALL_ED
Implemented All Universal Safety Interventions:  [place] to call system. Call bell, personal items and telephone within reach. Instruct patient to call for assistance. Room bathroom lighting operational. Non-slip footwear when patient is off stretcher. Physically safe environment: no spills, clutter or unnecessary equipment. Stretcher in lowest position, wheels locked, appropriate side rails in place.

## 2018-12-04 NOTE — ED ADULT NURSE NOTE - OBJECTIVE STATEMENT
33y female with no pmh presents to the ER for r/o ectopic pregnancy. pt is alert and oriented x 4 and speaking coherently. Pt states she has had LLQ for approx 1 week. pt states she thought it was GI pains and saw her pcp who found her HCG levels elevated. pt went to GYN and was told to follow up for possible ectopic pregnancy. pt in nad. pt talking and smiling. md lazo completed. will reassess.

## 2018-12-05 VITALS
HEART RATE: 78 BPM | TEMPERATURE: 98 F | OXYGEN SATURATION: 98 % | SYSTOLIC BLOOD PRESSURE: 118 MMHG | RESPIRATION RATE: 14 BRPM | DIASTOLIC BLOOD PRESSURE: 73 MMHG

## 2018-12-05 DIAGNOSIS — Z09 ENCOUNTER FOR FOLLOW-UP EXAMINATION AFTER COMPLETED TREATMENT FOR CONDITIONS OTHER THAN MALIGNANT NEOPLASM: ICD-10-CM

## 2018-12-05 LAB
CULTURE RESULTS: NO GROWTH — SIGNIFICANT CHANGE UP
HCT VFR BLD CALC: 35.1 % — SIGNIFICANT CHANGE UP (ref 34.5–45)
HGB BLD-MCNC: 11.7 G/DL — SIGNIFICANT CHANGE UP (ref 11.5–15.5)
KLEIHAUER-BETKE CALCULATION: 0.1 % — SIGNIFICANT CHANGE UP (ref 0–0.3)
SPECIMEN SOURCE: SIGNIFICANT CHANGE UP

## 2018-12-05 PROCEDURE — 88305 TISSUE EXAM BY PATHOLOGIST: CPT | Mod: 26

## 2018-12-05 RX ORDER — HYDROMORPHONE HYDROCHLORIDE 2 MG/ML
0.5 INJECTION INTRAMUSCULAR; INTRAVENOUS; SUBCUTANEOUS
Qty: 0 | Refills: 0 | Status: DISCONTINUED | OUTPATIENT
Start: 2018-12-05 | End: 2018-12-05

## 2018-12-05 RX ORDER — ONDANSETRON 8 MG/1
4 TABLET, FILM COATED ORAL ONCE
Qty: 0 | Refills: 0 | Status: DISCONTINUED | OUTPATIENT
Start: 2018-12-05 | End: 2018-12-05

## 2018-12-05 RX ORDER — HYDROMORPHONE HYDROCHLORIDE 2 MG/ML
1 INJECTION INTRAMUSCULAR; INTRAVENOUS; SUBCUTANEOUS
Qty: 0 | Refills: 0 | Status: DISCONTINUED | OUTPATIENT
Start: 2018-12-05 | End: 2018-12-05

## 2018-12-05 RX ORDER — OXYCODONE HYDROCHLORIDE 5 MG/1
1 TABLET ORAL
Qty: 15 | Refills: 0
Start: 2018-12-05

## 2018-12-05 NOTE — PROGRESS NOTE ADULT - PROBLEM SELECTOR PLAN 1
-Continue routine care  -Analgesia PRN  -OOB with assistance x 2, then Ad Tatianna  -Meds:   HYDROmorphone  Injectable 0.5 milliGRAM(s) IV Push every 10 minutes PRN  HYDROmorphone  Injectable 1 milliGRAM(s) IV Push every 10 minutes PRN  lactated ringers. 1000 milliLiter(s) IV Continuous <Continuous>  ondansetron Injectable 4 milliGRAM(s) IV Push once PRN      -IV saline lock  -Diet-  Regular Advance as Tolerated  -voiding  -s/p Rhogamx1 vial. KB pending

## 2018-12-05 NOTE — PROGRESS NOTE ADULT - SUBJECTIVE AND OBJECTIVE BOX
POST-OP CHECK    Allergies    No Known Allergies    Intolerances        S: Pt awake and alert walking around PACU     Pain controlled. Pt denies N/V, SOB, CP, palpitations.    O:   T(C): 36.1 (12-05-18 @ 08:21), Max: 36.2 (12-05-18 @ 08:00)  HR: 82 (12-05-18 @ 08:00) (82 - 82)  BP: 114/62 (12-05-18 @ 08:21) (114/62 - 115/68)  RR: 14 (12-05-18 @ 08:00) (14 - 14)  SpO2: 98% (12-05-18 @ 08:00) (98% - 98%)  Wt(kg): --  I&O's Summary    04 Dec 2018 07:01  -  05 Dec 2018 07:00  --------------------------------------------------------  IN: 1050 mL / OUT: 400 mL / NET: 650 mL    05 Dec 2018 07:01  -  05 Dec 2018 11:41  --------------------------------------------------------  IN: 225 mL / OUT: 550 mL / NET: -325 mL        CV: RRR, S1S2  Lungs: CTA B/L  Abd: occassional BS x 4 quadrants, soft, appropriately tender,   Inc:3 sites. right site noted to have surface bleeding- dressing replaced with good hemostasis. 2x opsite clean/dry/intact  Ext: pt is ambulating, Neg Hom[place] B/L

## 2018-12-05 NOTE — ASU DISCHARGE PLAN (ADULT/PEDIATRIC). - SPECIAL INSTRUCTIONS
Regular diet as tolerated, regular activity as tolerated, no heavy lifting for first two weeks.  Nothing per vagina: no intercourse, tampons or douching.  Call your provider if you experience fevers, chills, worsening abdominal pain, inability to urinate or worsening vaginal bleeding.  Follow up with your provider in 2 weeks. Regular diet as tolerated, regular activity as tolerated, no heavy lifting for first two weeks.  Nothing per vagina: no intercourse, tampons or douching.  Call your provider if you experience fevers, chills, worsening abdominal pain, inability to urinate or worsening vaginal bleeding.  Follow up with your provider in 1 week.

## 2018-12-05 NOTE — BRIEF OPERATIVE NOTE - PROCEDURE
<<-----Click on this checkbox to enter Procedure Left salpingectomy with removal of ectopic pregnancy  12/05/2018    Active  BARBI

## 2018-12-05 NOTE — ASU DISCHARGE PLAN (ADULT/PEDIATRIC). - FOLLOWUP APPOINTMENT CLINIC/PHYSICIAN
Follow up with Dr. Casillas in 2wks for post operative visit. Please call the office to make an appointment. Follow up with Dr. Casillas in 1 week for post operative visit. Please call the office to make an appointment.

## 2018-12-05 NOTE — ASU DISCHARGE PLAN (ADULT/PEDIATRIC). - NOTIFY
Unable to Urinate/Bleeding that does not stop/Pain not relieved by Medications/GYN Fever>100.4/Fever greater than 101

## 2018-12-05 NOTE — PROGRESS NOTE ADULT - ATTENDING COMMENTS
As above, pt doing well, no complaints, some ecchmosis but stable around rt mid port site, no active bleeding, pt to go home to f/u in 1 week

## 2018-12-05 NOTE — ASU DISCHARGE PLAN (ADULT/PEDIATRIC). - MEDICATION SUMMARY - MEDICATIONS TO TAKE
I will START or STAY ON the medications listed below when I get home from the hospital:    acetaminophen 325 mg oral tablet  -- 3 tab(s) by mouth every 6 hours  -- Indication: For prn pain     ibuprofen 600 mg oral tablet  -- 1 tab(s) by mouth every 6 hours  -- Indication: For prn pain    oxyCODONE 5 mg oral tablet  -- 1 tab(s) by mouth every 4 hours MDD:6  -- Caution federal law prohibits the transfer of this drug to any person other  than the person for whom it was prescribed.  It is very important that you take or use this exactly as directed.  Do not skip doses or discontinue unless directed by your doctor.  May cause drowsiness.  Alcohol may intensify this effect.  Use care when operating dangerous machinery.  This prescription cannot be refilled.  Using more of this medication than prescribed may cause serious breathing problems.    -- Indication: For prn pain

## 2018-12-05 NOTE — BRIEF OPERATIVE NOTE - OPERATION/FINDINGS
Hemoperitoneum with organized clot collection of approx 200cc of clotted blood adherent to anterior abdominal wall and left fallopian tube. Dilated left fallopian tube with ruptured ectopic pregnancy. Grossly normal upper abdominal survey.

## 2018-12-05 NOTE — PROGRESS NOTE ADULT - ASSESSMENT
A/P: 33y Female S/P ruptured left ectopic pregnancy s/p left salpingectomy    PAST MEDICAL & SURGICAL HISTORY:  No pertinent past medical history  No significant past surgical history

## 2018-12-05 NOTE — ASU DISCHARGE PLAN (ADULT/PEDIATRIC). - ACTIVITY LEVEL
no tub baths/no intercourse/no douching/2wks/nothing per rectum/no tampons/no heavy lifting/nothing per vagina

## 2018-12-11 LAB — SURGICAL PATHOLOGY STUDY: SIGNIFICANT CHANGE UP

## 2019-06-06 PROCEDURE — 82435 ASSAY OF BLOOD CHLORIDE: CPT

## 2019-06-06 PROCEDURE — 84132 ASSAY OF SERUM POTASSIUM: CPT

## 2019-06-06 PROCEDURE — 81025 URINE PREGNANCY TEST: CPT

## 2019-06-06 PROCEDURE — 76817 TRANSVAGINAL US OBSTETRIC: CPT

## 2019-06-06 PROCEDURE — 88305 TISSUE EXAM BY PATHOLOGIST: CPT

## 2019-06-06 PROCEDURE — 87086 URINE CULTURE/COLONY COUNT: CPT

## 2019-06-06 PROCEDURE — 80053 COMPREHEN METABOLIC PANEL: CPT

## 2019-06-06 PROCEDURE — 86900 BLOOD TYPING SEROLOGIC ABO: CPT

## 2019-06-06 PROCEDURE — 82330 ASSAY OF CALCIUM: CPT

## 2019-06-06 PROCEDURE — 85014 HEMATOCRIT: CPT

## 2019-06-06 PROCEDURE — 82803 BLOOD GASES ANY COMBINATION: CPT

## 2019-06-06 PROCEDURE — 85460 HEMOGLOBIN FETAL: CPT

## 2019-06-06 PROCEDURE — 84702 CHORIONIC GONADOTROPIN TEST: CPT

## 2019-06-06 PROCEDURE — 99285 EMERGENCY DEPT VISIT HI MDM: CPT

## 2019-06-06 PROCEDURE — 85027 COMPLETE CBC AUTOMATED: CPT

## 2019-06-06 PROCEDURE — 82947 ASSAY GLUCOSE BLOOD QUANT: CPT

## 2019-06-06 PROCEDURE — 81001 URINALYSIS AUTO W/SCOPE: CPT

## 2019-06-06 PROCEDURE — 85610 PROTHROMBIN TIME: CPT

## 2019-06-06 PROCEDURE — 86901 BLOOD TYPING SEROLOGIC RH(D): CPT

## 2019-06-06 PROCEDURE — 86850 RBC ANTIBODY SCREEN: CPT

## 2019-06-06 PROCEDURE — 85730 THROMBOPLASTIN TIME PARTIAL: CPT

## 2019-06-06 PROCEDURE — 83605 ASSAY OF LACTIC ACID: CPT

## 2019-06-06 PROCEDURE — 85018 HEMOGLOBIN: CPT

## 2019-06-06 PROCEDURE — 93975 VASCULAR STUDY: CPT

## 2019-06-06 PROCEDURE — 84295 ASSAY OF SERUM SODIUM: CPT

## 2019-10-15 ENCOUNTER — TRANSCRIPTION ENCOUNTER (OUTPATIENT)
Age: 34
End: 2019-10-15

## 2019-10-15 ENCOUNTER — EMERGENCY (EMERGENCY)
Facility: HOSPITAL | Age: 34
LOS: 1 days | Discharge: HOME | End: 2019-10-15
Attending: EMERGENCY MEDICINE
Payer: MEDICAID

## 2019-10-15 VITALS
DIASTOLIC BLOOD PRESSURE: 80 MMHG | TEMPERATURE: 98 F | RESPIRATION RATE: 18 BRPM | OXYGEN SATURATION: 97 % | HEIGHT: 63 IN | HEART RATE: 68 BPM | WEIGHT: 186.07 LBS | SYSTOLIC BLOOD PRESSURE: 116 MMHG

## 2019-10-15 PROCEDURE — 99284 EMERGENCY DEPT VISIT MOD MDM: CPT

## 2019-10-15 NOTE — ED ADULT NURSE NOTE - OBJECTIVE STATEMENT
34F aaox4 ambulatory  had 1 ectopic pregnancy p/w c/o vaginal bright red spotting and c/o low back pain.

## 2019-10-15 NOTE — ED ADULT NURSE NOTE - NSIMPLEMENTINTERV_GEN_ALL_ED
Implemented All Universal Safety Interventions:  [place] to call system. Call bell, personal items and telephone within reach. Instruct patient to call for assistance. Room bathroom lighting operational. Non-slip footwear when patient is off stretcher. Physically safe environment: no spills, clutter or unnecessary equipment. Stretcher in lowest position, wheels locked, appropriate side rails in place.

## 2019-10-16 LAB
ALBUMIN SERPL ELPH-MCNC: 4.5 G/DL — SIGNIFICANT CHANGE UP (ref 3.3–5)
ALP SERPL-CCNC: 73 U/L — SIGNIFICANT CHANGE UP (ref 40–120)
ALT FLD-CCNC: 9 U/L — LOW (ref 10–45)
ANION GAP SERPL CALC-SCNC: 12 MMOL/L — SIGNIFICANT CHANGE UP (ref 5–17)
APPEARANCE UR: CLEAR — SIGNIFICANT CHANGE UP
APTT BLD: 28.4 SEC — SIGNIFICANT CHANGE UP (ref 27.5–36.3)
AST SERPL-CCNC: 15 U/L — SIGNIFICANT CHANGE UP (ref 10–40)
BACTERIA # UR AUTO: NEGATIVE — SIGNIFICANT CHANGE UP
BASOPHILS # BLD AUTO: 0.06 K/UL — SIGNIFICANT CHANGE UP (ref 0–0.2)
BASOPHILS NFR BLD AUTO: 0.6 % — SIGNIFICANT CHANGE UP (ref 0–2)
BILIRUB SERPL-MCNC: 0.2 MG/DL — SIGNIFICANT CHANGE UP (ref 0.2–1.2)
BILIRUB UR-MCNC: NEGATIVE — SIGNIFICANT CHANGE UP
BLD GP AB SCN SERPL QL: NEGATIVE — SIGNIFICANT CHANGE UP
BUN SERPL-MCNC: 12 MG/DL — SIGNIFICANT CHANGE UP (ref 7–23)
CALCIUM SERPL-MCNC: 9.6 MG/DL — SIGNIFICANT CHANGE UP (ref 8.4–10.5)
CHLORIDE SERPL-SCNC: 101 MMOL/L — SIGNIFICANT CHANGE UP (ref 96–108)
CO2 SERPL-SCNC: 24 MMOL/L — SIGNIFICANT CHANGE UP (ref 22–31)
COLOR SPEC: COLORLESS — SIGNIFICANT CHANGE UP
CREAT SERPL-MCNC: 0.7 MG/DL — SIGNIFICANT CHANGE UP (ref 0.5–1.3)
CULTURE RESULTS: SIGNIFICANT CHANGE UP
DIFF PNL FLD: NEGATIVE — SIGNIFICANT CHANGE UP
EOSINOPHIL # BLD AUTO: 0.34 K/UL — SIGNIFICANT CHANGE UP (ref 0–0.5)
EOSINOPHIL NFR BLD AUTO: 3.6 % — SIGNIFICANT CHANGE UP (ref 0–6)
EPI CELLS # UR: 0 /HPF — SIGNIFICANT CHANGE UP
GLUCOSE SERPL-MCNC: 102 MG/DL — HIGH (ref 70–99)
GLUCOSE UR QL: NEGATIVE — SIGNIFICANT CHANGE UP
HCG SERPL-ACNC: 8596 MIU/ML — HIGH
HCT VFR BLD CALC: 42.8 % — SIGNIFICANT CHANGE UP (ref 34.5–45)
HGB BLD-MCNC: 14.5 G/DL — SIGNIFICANT CHANGE UP (ref 11.5–15.5)
HYALINE CASTS # UR AUTO: 0 /LPF — SIGNIFICANT CHANGE UP (ref 0–2)
IMM GRANULOCYTES NFR BLD AUTO: 0.2 % — SIGNIFICANT CHANGE UP (ref 0–1.5)
INR BLD: 0.88 RATIO — SIGNIFICANT CHANGE UP (ref 0.88–1.16)
KETONES UR-MCNC: NEGATIVE — SIGNIFICANT CHANGE UP
LEUKOCYTE ESTERASE UR-ACNC: NEGATIVE — SIGNIFICANT CHANGE UP
LYMPHOCYTES # BLD AUTO: 3.77 K/UL — HIGH (ref 1–3.3)
LYMPHOCYTES # BLD AUTO: 40.1 % — SIGNIFICANT CHANGE UP (ref 13–44)
MCHC RBC-ENTMCNC: 28.2 PG — SIGNIFICANT CHANGE UP (ref 27–34)
MCHC RBC-ENTMCNC: 33.9 GM/DL — SIGNIFICANT CHANGE UP (ref 32–36)
MCV RBC AUTO: 83.1 FL — SIGNIFICANT CHANGE UP (ref 80–100)
MONOCYTES # BLD AUTO: 0.62 K/UL — SIGNIFICANT CHANGE UP (ref 0–0.9)
MONOCYTES NFR BLD AUTO: 6.6 % — SIGNIFICANT CHANGE UP (ref 2–14)
NEUTROPHILS # BLD AUTO: 4.58 K/UL — SIGNIFICANT CHANGE UP (ref 1.8–7.4)
NEUTROPHILS NFR BLD AUTO: 48.9 % — SIGNIFICANT CHANGE UP (ref 43–77)
NITRITE UR-MCNC: NEGATIVE — SIGNIFICANT CHANGE UP
NRBC # BLD: 0 /100 WBCS — SIGNIFICANT CHANGE UP (ref 0–0)
PH UR: 6 — SIGNIFICANT CHANGE UP (ref 5–8)
PLATELET # BLD AUTO: 233 K/UL — SIGNIFICANT CHANGE UP (ref 150–400)
POTASSIUM SERPL-MCNC: 3.9 MMOL/L — SIGNIFICANT CHANGE UP (ref 3.5–5.3)
POTASSIUM SERPL-SCNC: 3.9 MMOL/L — SIGNIFICANT CHANGE UP (ref 3.5–5.3)
PROT SERPL-MCNC: 7.6 G/DL — SIGNIFICANT CHANGE UP (ref 6–8.3)
PROT UR-MCNC: NEGATIVE — SIGNIFICANT CHANGE UP
PROTHROM AB SERPL-ACNC: 10 SEC — SIGNIFICANT CHANGE UP (ref 10–12.9)
RBC # BLD: 5.15 M/UL — SIGNIFICANT CHANGE UP (ref 3.8–5.2)
RBC # FLD: 13.9 % — SIGNIFICANT CHANGE UP (ref 10.3–14.5)
RBC CASTS # UR COMP ASSIST: 0 /HPF — SIGNIFICANT CHANGE UP (ref 0–4)
RH IG SCN BLD-IMP: NEGATIVE — SIGNIFICANT CHANGE UP
SODIUM SERPL-SCNC: 137 MMOL/L — SIGNIFICANT CHANGE UP (ref 135–145)
SP GR SPEC: 1.01 — LOW (ref 1.01–1.02)
SPECIMEN SOURCE: SIGNIFICANT CHANGE UP
UROBILINOGEN FLD QL: NEGATIVE — SIGNIFICANT CHANGE UP
WBC # BLD: 9.39 K/UL — SIGNIFICANT CHANGE UP (ref 3.8–10.5)
WBC # FLD AUTO: 9.39 K/UL — SIGNIFICANT CHANGE UP (ref 3.8–10.5)
WBC UR QL: 0 /HPF — SIGNIFICANT CHANGE UP (ref 0–5)

## 2019-10-16 PROCEDURE — 86901 BLOOD TYPING SEROLOGIC RH(D): CPT

## 2019-10-16 PROCEDURE — 76817 TRANSVAGINAL US OBSTETRIC: CPT | Mod: 26

## 2019-10-16 PROCEDURE — 86900 BLOOD TYPING SEROLOGIC ABO: CPT

## 2019-10-16 PROCEDURE — 86850 RBC ANTIBODY SCREEN: CPT

## 2019-10-16 PROCEDURE — 76817 TRANSVAGINAL US OBSTETRIC: CPT

## 2019-10-16 PROCEDURE — 87086 URINE CULTURE/COLONY COUNT: CPT

## 2019-10-16 PROCEDURE — 85027 COMPLETE CBC AUTOMATED: CPT

## 2019-10-16 PROCEDURE — 80053 COMPREHEN METABOLIC PANEL: CPT

## 2019-10-16 PROCEDURE — 85610 PROTHROMBIN TIME: CPT

## 2019-10-16 PROCEDURE — 84702 CHORIONIC GONADOTROPIN TEST: CPT

## 2019-10-16 PROCEDURE — 85730 THROMBOPLASTIN TIME PARTIAL: CPT

## 2019-10-16 PROCEDURE — 99284 EMERGENCY DEPT VISIT MOD MDM: CPT

## 2019-10-16 PROCEDURE — 81001 URINALYSIS AUTO W/SCOPE: CPT

## 2019-10-16 NOTE — ED PROVIDER NOTE - OBJECTIVE STATEMENT
34F  LMP  p/w low back pain. Pt states bilateral low back pain, 1/10 pain, worse with movement. Feels like crampy pain. Started 2 days ago. Also having some blood tinged spotting, <1 pad per day. Has h/o ruptured ectopic pregnancy. Scheduled for ultrasound next week for first prenatal visit.

## 2019-10-16 NOTE — ED PROVIDER NOTE - CARE PROVIDER_API CALL
Alphonse Inman (MD)  Obstetrics and Gynecology  [address] [place] [address]  Phone: [phone]  Fax: [phone]  Follow Up Time:

## 2019-10-16 NOTE — ED PROVIDER NOTE - NSFOLLOWUPINSTRUCTIONS_ED_ALL_ED_FT
Please follow up with your OBGYN at your previously scheduled appointment    Please return to the ER with any questions or concerns.    Please see the attached information on first trimester pregnancy for additional information

## 2019-10-16 NOTE — ED PROVIDER NOTE - CLINICAL SUMMARY MEDICAL DECISION MAKING FREE TEXT BOX
34F , LMP 8/20 p/w light vaginal spotting and back pain. Likely pregnancy related cramping. Low suspicion for ectopic or , will obtain ultrasound to evaluate for IUP. Dispo likely home with OB f/u

## 2019-10-16 NOTE — ED PROVIDER NOTE - NS ED ROS FT
REVIEW OF SYSTEMS:  General:  no fever, no chills  HEENT: no headache, no vision changes  Cardiac: no chest pain, no palpitations  Respiratory: no cough, no shortness of breath  Gastrointestinal: no abdominal pain, no nausea, no vomiting, no diarrhea  Genitourinary: +vaginal bleeding. no hematuria, no dysuria, no urinary frequency  Extremities: +back pain, no extremity swelling, no extremity pain  Neuro: no focal weakness, no numbness/tingling of the extremities, no decreased sensation  Heme: no easy bleeding, no easy bruising  Skin: no jaundice,  no rashes, no lesions  All other ROS as documented in HPI  -rToy Flowers, PGY-2

## 2019-10-16 NOTE — ED PROVIDER NOTE - PHYSICAL EXAMINATION
General: Well developed, well nourished  HEENT: Normocephalic and atraumatic, EOMI, Trachea midline.   Cardiac: Normal S1 and S2 w/ RRR. No MRG.  Pulmonary: CTA bilaterally. No increased WOB.   Abdominal: Soft, NTND  : No CVA tenderness   Neurologic: No focal sensory or motor deficits.  Musculoskeletal: No spinal/paraspinal tenderness. No limited ROM.  Vascular: Warm and well perfused  Skin: Color appropriate for race.   Psychiatric: Appropriate mood and affect. No apparent risk to self or others.  Troy Flowers, PGY-2 General: Well developed, well nourished  HEENT: Normocephalic and atraumatic, Trachea midline.   Cardiac: Normal S1 and S2 w/ RRR. No MRG.  Pulmonary: CTA bilaterally. No increased WOB.   Abdominal: Soft, NTND  : No CVA tenderness   Neurologic: No focal sensory or motor deficits.  Musculoskeletal: No spinal/paraspinal tenderness. No limited ROM.  Vascular: Warm and well perfused  Skin: Color appropriate for race.   Psychiatric: Appropriate mood and affect. No apparent risk to self or others.  Troy Flowers, PGY-2

## 2019-10-16 NOTE — ED PROVIDER NOTE - ATTENDING CONTRIBUTION TO CARE
1st trimester bleeding eval - very minimal spotting, benign exam, U/S here shows viable IUP. no need for rhogham given minimal spotting. UA neg. Pt is safe for discharge with obgyn f/u and threatened AB instructions.

## 2019-10-16 NOTE — ED PROVIDER NOTE - PATIENT PORTAL LINK FT
You can access the FollowMyHealth Patient Portal offered by [facility] by registering at the following website: [email or web address]. By joining [facility]’s FollowMyHealth portal, you will also be able to view your health information using other applications (apps) compatible with our system.

## 2019-12-11 NOTE — PATIENT PROFILE OB - BABYS CARE PROVIDER NAME, OB PROFILE

## 2020-05-19 PROBLEM — O00.90 UNSPECIFIED ECTOPIC PREGNANCY WITHOUT INTRAUTERINE PREGNANCY: Chronic | Status: ACTIVE | Noted: 2019-10-15

## 2020-05-26 DIAGNOSIS — Z01.818 ENCOUNTER FOR OTHER PREPROCEDURAL EXAMINATION: ICD-10-CM

## 2020-05-27 ENCOUNTER — TRANSCRIPTION ENCOUNTER (OUTPATIENT)
Age: 35
End: 2020-05-27

## 2020-05-27 ENCOUNTER — APPOINTMENT (OUTPATIENT)
Dept: DISASTER EMERGENCY | Facility: CLINIC | Age: 35
End: 2020-05-27

## 2020-05-28 ENCOUNTER — INPATIENT (INPATIENT)
Facility: HOSPITAL | Age: 35
LOS: 5 days | Discharge: HOME | End: 2020-06-03
Attending: OBSTETRICS & GYNECOLOGY | Admitting: OBSTETRICS & GYNECOLOGY
Payer: MEDICAID

## 2020-05-28 VITALS
SYSTOLIC BLOOD PRESSURE: 120 MMHG | DIASTOLIC BLOOD PRESSURE: 76 MMHG | HEIGHT: 63 IN | HEART RATE: 122 BPM | TEMPERATURE: 99 F | RESPIRATION RATE: 20 BRPM | OXYGEN SATURATION: 100 % | WEIGHT: 240.97 LBS

## 2020-05-28 DIAGNOSIS — O48.1 PROLONGED PREGNANCY: ICD-10-CM

## 2020-05-28 DIAGNOSIS — O24.414 GESTATIONAL DIABETES MELLITUS IN PREGNANCY, INSULIN CONTROLLED: ICD-10-CM

## 2020-05-28 LAB
ALBUMIN SERPL ELPH-MCNC: 3.2 G/DL — LOW (ref 3.3–5)
ALBUMIN SERPL ELPH-MCNC: 3.2 G/DL — LOW (ref 3.3–5)
ALBUMIN SERPL ELPH-MCNC: 4.4 G/DL — SIGNIFICANT CHANGE UP (ref 3.3–5)
ALP SERPL-CCNC: 139 U/L — HIGH (ref 40–120)
ALP SERPL-CCNC: 66 U/L — SIGNIFICANT CHANGE UP (ref 40–120)
ALP SERPL-CCNC: 67 U/L — SIGNIFICANT CHANGE UP (ref 40–120)
ALT FLD-CCNC: 15 U/L — SIGNIFICANT CHANGE UP (ref 10–45)
ALT FLD-CCNC: 19 U/L — SIGNIFICANT CHANGE UP (ref 10–45)
ALT FLD-CCNC: 21 U/L — SIGNIFICANT CHANGE UP (ref 10–45)
ANION GAP SERPL CALC-SCNC: 17 MMOL/L — SIGNIFICANT CHANGE UP (ref 5–17)
ANION GAP SERPL CALC-SCNC: 19 MMOL/L — HIGH (ref 5–17)
ANION GAP SERPL CALC-SCNC: 26 MMOL/L — HIGH (ref 5–17)
APTT BLD: 22.3 SEC — LOW (ref 27.5–36.3)
APTT BLD: 23.9 SEC — LOW (ref 27.5–36.3)
APTT BLD: 25.1 SEC — LOW (ref 27.5–36.3)
AST SERPL-CCNC: 17 U/L — SIGNIFICANT CHANGE UP (ref 10–40)
AST SERPL-CCNC: 21 U/L — SIGNIFICANT CHANGE UP (ref 10–40)
AST SERPL-CCNC: 28 U/L — SIGNIFICANT CHANGE UP (ref 10–40)
BASE EXCESS BLDMV CALC-SCNC: -11 MMOL/L — LOW (ref -3–3)
BASOPHILS # BLD AUTO: 0.04 K/UL — SIGNIFICANT CHANGE UP (ref 0–0.2)
BASOPHILS # BLD AUTO: 0.04 K/UL — SIGNIFICANT CHANGE UP (ref 0–0.2)
BASOPHILS NFR BLD AUTO: 0.3 % — SIGNIFICANT CHANGE UP (ref 0–2)
BASOPHILS NFR BLD AUTO: 0.3 % — SIGNIFICANT CHANGE UP (ref 0–2)
BILIRUB SERPL-MCNC: 0.2 MG/DL — SIGNIFICANT CHANGE UP (ref 0.2–1.2)
BILIRUB SERPL-MCNC: 0.8 MG/DL — SIGNIFICANT CHANGE UP (ref 0.2–1.2)
BILIRUB SERPL-MCNC: 1.4 MG/DL — HIGH (ref 0.2–1.2)
BLD GP AB SCN SERPL QL: POSITIVE — SIGNIFICANT CHANGE UP
BUN SERPL-MCNC: 12 MG/DL — SIGNIFICANT CHANGE UP (ref 7–23)
BUN SERPL-MCNC: 7 MG/DL — SIGNIFICANT CHANGE UP (ref 7–23)
BUN SERPL-MCNC: 9 MG/DL — SIGNIFICANT CHANGE UP (ref 7–23)
CALCIUM SERPL-MCNC: 10.4 MG/DL — SIGNIFICANT CHANGE UP (ref 8.4–10.5)
CALCIUM SERPL-MCNC: 8.4 MG/DL — SIGNIFICANT CHANGE UP (ref 8.4–10.5)
CALCIUM SERPL-MCNC: 9.4 MG/DL — SIGNIFICANT CHANGE UP (ref 8.4–10.5)
CHLORIDE SERPL-SCNC: 102 MMOL/L — SIGNIFICANT CHANGE UP (ref 96–108)
CHLORIDE SERPL-SCNC: 102 MMOL/L — SIGNIFICANT CHANGE UP (ref 96–108)
CHLORIDE SERPL-SCNC: 104 MMOL/L — SIGNIFICANT CHANGE UP (ref 96–108)
CO2 BLDMV-SCNC: 18 MMOL/L — LOW (ref 21–29)
CO2 SERPL-SCNC: 17 MMOL/L — LOW (ref 22–31)
CO2 SERPL-SCNC: 21 MMOL/L — LOW (ref 22–31)
CO2 SERPL-SCNC: <10 MMOL/L — CRITICAL LOW (ref 22–31)
CREAT SERPL-MCNC: 0.45 MG/DL — LOW (ref 0.5–1.3)
CREAT SERPL-MCNC: 0.52 MG/DL — SIGNIFICANT CHANGE UP (ref 0.5–1.3)
CREAT SERPL-MCNC: 0.85 MG/DL — SIGNIFICANT CHANGE UP (ref 0.5–1.3)
EOSINOPHIL # BLD AUTO: 0.17 K/UL — SIGNIFICANT CHANGE UP (ref 0–0.5)
EOSINOPHIL # BLD AUTO: 0.18 K/UL — SIGNIFICANT CHANGE UP (ref 0–0.5)
EOSINOPHIL NFR BLD AUTO: 1.1 % — SIGNIFICANT CHANGE UP (ref 0–6)
EOSINOPHIL NFR BLD AUTO: 1.4 % — SIGNIFICANT CHANGE UP (ref 0–6)
FIBRINOGEN PPP-MCNC: 357 MG/DL — SIGNIFICANT CHANGE UP (ref 350–510)
FIBRINOGEN PPP-MCNC: 385 MG/DL — SIGNIFICANT CHANGE UP (ref 350–510)
GAS PNL BLDA: SIGNIFICANT CHANGE UP
GAS PNL BLDMV: SIGNIFICANT CHANGE UP
GLUCOSE BLDC GLUCOMTR-MCNC: 107 MG/DL — HIGH (ref 70–99)
GLUCOSE BLDC GLUCOMTR-MCNC: 112 MG/DL — HIGH (ref 70–99)
GLUCOSE BLDC GLUCOMTR-MCNC: 97 MG/DL — SIGNIFICANT CHANGE UP (ref 70–99)
GLUCOSE SERPL-MCNC: 143 MG/DL — HIGH (ref 70–99)
GLUCOSE SERPL-MCNC: 209 MG/DL — HIGH (ref 70–99)
GLUCOSE SERPL-MCNC: 372 MG/DL — HIGH (ref 70–99)
HCO3 BLDMV-SCNC: 16 MMOL/L — LOW (ref 20–28)
HCT VFR BLD CALC: 32.1 % — LOW (ref 34.5–45)
HCT VFR BLD CALC: 34.6 % — SIGNIFICANT CHANGE UP (ref 34.5–45)
HCT VFR BLD CALC: 36.2 % — SIGNIFICANT CHANGE UP (ref 34.5–45)
HCT VFR BLD CALC: 39.2 % — SIGNIFICANT CHANGE UP (ref 34.5–45)
HEPARINASE TEG R TIME: 4.6 MIN — SIGNIFICANT CHANGE UP (ref 4.3–8.3)
HEPARINASE TEG R TIME: 4.9 MIN — SIGNIFICANT CHANGE UP (ref 4.3–8.3)
HGB BLD-MCNC: 10.7 G/DL — LOW (ref 11.5–15.5)
HGB BLD-MCNC: 11.2 G/DL — LOW (ref 11.5–15.5)
HGB BLD-MCNC: 12.1 G/DL — SIGNIFICANT CHANGE UP (ref 11.5–15.5)
HGB BLD-MCNC: 12.8 G/DL — SIGNIFICANT CHANGE UP (ref 11.5–15.5)
IMM GRANULOCYTES NFR BLD AUTO: 0.4 % — SIGNIFICANT CHANGE UP (ref 0–1.5)
IMM GRANULOCYTES NFR BLD AUTO: 1 % — SIGNIFICANT CHANGE UP (ref 0–1.5)
INR BLD: 1.01 RATIO — SIGNIFICANT CHANGE UP (ref 0.88–1.16)
INR BLD: 1.03 RATIO — SIGNIFICANT CHANGE UP (ref 0.88–1.16)
INR BLD: 1.04 RATIO — SIGNIFICANT CHANGE UP (ref 0.88–1.16)
LACTATE SERPL-SCNC: 14.2 MMOL/L — CRITICAL HIGH (ref 0.7–2)
LACTATE SERPL-SCNC: 7 MMOL/L — CRITICAL HIGH (ref 0.7–2)
LYMPHOCYTES # BLD AUTO: 1.02 K/UL — SIGNIFICANT CHANGE UP (ref 1–3.3)
LYMPHOCYTES # BLD AUTO: 23.8 % — SIGNIFICANT CHANGE UP (ref 13–44)
LYMPHOCYTES # BLD AUTO: 3.02 K/UL — SIGNIFICANT CHANGE UP (ref 1–3.3)
LYMPHOCYTES # BLD AUTO: 6.6 % — LOW (ref 13–44)
MAGNESIUM SERPL-MCNC: 1.3 MG/DL — LOW (ref 1.6–2.6)
MCHC RBC-ENTMCNC: 28.4 PG — SIGNIFICANT CHANGE UP (ref 27–34)
MCHC RBC-ENTMCNC: 28.9 PG — SIGNIFICANT CHANGE UP (ref 27–34)
MCHC RBC-ENTMCNC: 29.2 PG — SIGNIFICANT CHANGE UP (ref 27–34)
MCHC RBC-ENTMCNC: 29.4 PG — SIGNIFICANT CHANGE UP (ref 27–34)
MCHC RBC-ENTMCNC: 30.9 GM/DL — LOW (ref 32–36)
MCHC RBC-ENTMCNC: 32.7 GM/DL — SIGNIFICANT CHANGE UP (ref 32–36)
MCHC RBC-ENTMCNC: 33.3 GM/DL — SIGNIFICANT CHANGE UP (ref 32–36)
MCHC RBC-ENTMCNC: 35 GM/DL — SIGNIFICANT CHANGE UP (ref 32–36)
MCV RBC AUTO: 82.6 FL — SIGNIFICANT CHANGE UP (ref 80–100)
MCV RBC AUTO: 85.1 FL — SIGNIFICANT CHANGE UP (ref 80–100)
MCV RBC AUTO: 89.9 FL — SIGNIFICANT CHANGE UP (ref 80–100)
MCV RBC AUTO: 94.3 FL — SIGNIFICANT CHANGE UP (ref 80–100)
MONOCYTES # BLD AUTO: 0.86 K/UL — SIGNIFICANT CHANGE UP (ref 0–0.9)
MONOCYTES # BLD AUTO: 0.96 K/UL — HIGH (ref 0–0.9)
MONOCYTES NFR BLD AUTO: 5.6 % — SIGNIFICANT CHANGE UP (ref 2–14)
MONOCYTES NFR BLD AUTO: 7.6 % — SIGNIFICANT CHANGE UP (ref 2–14)
NEUTROPHILS # BLD AUTO: 13.12 K/UL — HIGH (ref 1.8–7.4)
NEUTROPHILS # BLD AUTO: 8.46 K/UL — HIGH (ref 1.8–7.4)
NEUTROPHILS NFR BLD AUTO: 66.5 % — SIGNIFICANT CHANGE UP (ref 43–77)
NEUTROPHILS NFR BLD AUTO: 85.4 % — HIGH (ref 43–77)
NRBC # BLD: 0 /100 WBCS — SIGNIFICANT CHANGE UP (ref 0–0)
O2 CT VFR BLD CALC: 63 MMHG — SIGNIFICANT CHANGE UP (ref 30–65)
PCO2 BLDMV: 44 MMHG — SIGNIFICANT CHANGE UP (ref 30–65)
PH BLDMV: 7.19 — CRITICAL LOW (ref 7.32–7.45)
PHOSPHATE SERPL-MCNC: 4.7 MG/DL — HIGH (ref 2.5–4.5)
PLATELET # BLD AUTO: 117 K/UL — LOW (ref 150–400)
PLATELET # BLD AUTO: 122 K/UL — LOW (ref 150–400)
PLATELET # BLD AUTO: 201 K/UL — SIGNIFICANT CHANGE UP (ref 150–400)
PLATELET # BLD AUTO: 278 K/UL — SIGNIFICANT CHANGE UP (ref 150–400)
POTASSIUM SERPL-MCNC: 3.5 MMOL/L — SIGNIFICANT CHANGE UP (ref 3.5–5.3)
POTASSIUM SERPL-MCNC: 3.8 MMOL/L — SIGNIFICANT CHANGE UP (ref 3.5–5.3)
POTASSIUM SERPL-MCNC: 4.3 MMOL/L — SIGNIFICANT CHANGE UP (ref 3.5–5.3)
POTASSIUM SERPL-SCNC: 3.5 MMOL/L — SIGNIFICANT CHANGE UP (ref 3.5–5.3)
POTASSIUM SERPL-SCNC: 3.8 MMOL/L — SIGNIFICANT CHANGE UP (ref 3.5–5.3)
POTASSIUM SERPL-SCNC: 4.3 MMOL/L — SIGNIFICANT CHANGE UP (ref 3.5–5.3)
PROT SERPL-MCNC: 5 G/DL — LOW (ref 6–8.3)
PROT SERPL-MCNC: 5.6 G/DL — LOW (ref 6–8.3)
PROT SERPL-MCNC: 6.4 G/DL — SIGNIFICANT CHANGE UP (ref 6–8.3)
PROTHROM AB SERPL-ACNC: 11.6 SEC — SIGNIFICANT CHANGE UP (ref 10–12.9)
PROTHROM AB SERPL-ACNC: 11.9 SEC — SIGNIFICANT CHANGE UP (ref 10–12.9)
PROTHROM AB SERPL-ACNC: 12 SEC — SIGNIFICANT CHANGE UP (ref 10–12.9)
RAPIDTEG MAXIMUM AMPLITUDE: 60.4 MM — SIGNIFICANT CHANGE UP (ref 52–70)
RAPIDTEG MAXIMUM AMPLITUDE: 60.8 MM — SIGNIFICANT CHANGE UP (ref 52–70)
RBC # BLD: 3.77 M/UL — LOW (ref 3.8–5.2)
RBC # BLD: 3.84 M/UL — SIGNIFICANT CHANGE UP (ref 3.8–5.2)
RBC # BLD: 4.19 M/UL — SIGNIFICANT CHANGE UP (ref 3.8–5.2)
RBC # BLD: 4.36 M/UL — SIGNIFICANT CHANGE UP (ref 3.8–5.2)
RBC # FLD: 13.2 % — SIGNIFICANT CHANGE UP (ref 10.3–14.5)
RBC # FLD: 13.4 % — SIGNIFICANT CHANGE UP (ref 10.3–14.5)
RBC # FLD: 15.6 % — HIGH (ref 10.3–14.5)
RBC # FLD: 15.8 % — HIGH (ref 10.3–14.5)
RH IG SCN BLD-IMP: NEGATIVE — SIGNIFICANT CHANGE UP
SAO2 % BLDMV: 90 % — SIGNIFICANT CHANGE UP (ref 60–90)
SARS-COV-2 N GENE NPH QL NAA+PROBE: NOT DETECTED
SODIUM SERPL-SCNC: 135 MMOL/L — SIGNIFICANT CHANGE UP (ref 135–145)
SODIUM SERPL-SCNC: 140 MMOL/L — SIGNIFICANT CHANGE UP (ref 135–145)
SODIUM SERPL-SCNC: 140 MMOL/L — SIGNIFICANT CHANGE UP (ref 135–145)
T PALLIDUM AB TITR SER: NEGATIVE — SIGNIFICANT CHANGE UP
TEG FUNCTIONAL FIBRINOGEN: 17.5 MM — SIGNIFICANT CHANGE UP (ref 15–32)
TEG FUNCTIONAL FIBRINOGEN: 18.7 MM — SIGNIFICANT CHANGE UP (ref 15–32)
TEG MAXIMUM AMPLITUDE: 58.9 MM — SIGNIFICANT CHANGE UP (ref 52–69)
TEG MAXIMUM AMPLITUDE: 61.6 MM — SIGNIFICANT CHANGE UP (ref 52–69)
TEG REACTION TIME: 10.4 MIN (ref 4.6–9.1)
TEG REACTION TIME: 4.7 MIN — SIGNIFICANT CHANGE UP (ref 4.6–9.1)
WBC # BLD: 12.71 K/UL — HIGH (ref 3.8–10.5)
WBC # BLD: 15.37 K/UL — HIGH (ref 3.8–10.5)
WBC # BLD: 16.12 K/UL — HIGH (ref 3.8–10.5)
WBC # BLD: 38.36 K/UL — HIGH (ref 3.8–10.5)
WBC # FLD AUTO: 12.71 K/UL — HIGH (ref 3.8–10.5)
WBC # FLD AUTO: 15.37 K/UL — HIGH (ref 3.8–10.5)
WBC # FLD AUTO: 16.12 K/UL — HIGH (ref 3.8–10.5)
WBC # FLD AUTO: 38.36 K/UL — HIGH (ref 3.8–10.5)

## 2020-05-28 PROCEDURE — 71045 X-RAY EXAM CHEST 1 VIEW: CPT | Mod: 26,77

## 2020-05-28 PROCEDURE — 88302 TISSUE EXAM BY PATHOLOGIST: CPT | Mod: 26

## 2020-05-28 PROCEDURE — 74018 RADEX ABDOMEN 1 VIEW: CPT | Mod: 26

## 2020-05-28 PROCEDURE — 88305 TISSUE EXAM BY PATHOLOGIST: CPT | Mod: 26

## 2020-05-28 PROCEDURE — 99223 1ST HOSP IP/OBS HIGH 75: CPT

## 2020-05-28 PROCEDURE — 88307 TISSUE EXAM BY PATHOLOGIST: CPT | Mod: 26

## 2020-05-28 PROCEDURE — 71045 X-RAY EXAM CHEST 1 VIEW: CPT | Mod: 26

## 2020-05-28 PROCEDURE — 58150 TOTAL HYSTERECTOMY: CPT

## 2020-05-28 PROCEDURE — 86077 PHYS BLOOD BANK SERV XMATCH: CPT

## 2020-05-28 RX ORDER — ALBUMIN HUMAN 25 %
500 VIAL (ML) INTRAVENOUS ONCE
Refills: 0 | Status: DISCONTINUED | OUTPATIENT
Start: 2020-05-28 | End: 2020-05-28

## 2020-05-28 RX ORDER — PROPOFOL 10 MG/ML
50 INJECTION, EMULSION INTRAVENOUS
Qty: 1000 | Refills: 0 | Status: DISCONTINUED | OUTPATIENT
Start: 2020-05-28 | End: 2020-05-28

## 2020-05-28 RX ORDER — CHLORHEXIDINE GLUCONATE 213 G/1000ML
15 SOLUTION TOPICAL EVERY 12 HOURS
Refills: 0 | Status: DISCONTINUED | OUTPATIENT
Start: 2020-05-28 | End: 2020-05-28

## 2020-05-28 RX ORDER — SODIUM CHLORIDE 9 MG/ML
1000 INJECTION, SOLUTION INTRAVENOUS
Refills: 0 | Status: DISCONTINUED | OUTPATIENT
Start: 2020-05-28 | End: 2020-05-28

## 2020-05-28 RX ORDER — OXYTOCIN 10 UNIT/ML
10 VIAL (ML) INJECTION ONCE
Refills: 0 | Status: COMPLETED | OUTPATIENT
Start: 2020-05-28 | End: 2020-05-28

## 2020-05-28 RX ORDER — CHLORHEXIDINE GLUCONATE 213 G/1000ML
1 SOLUTION TOPICAL
Refills: 0 | Status: DISCONTINUED | OUTPATIENT
Start: 2020-05-28 | End: 2020-05-28

## 2020-05-28 RX ORDER — HYDROMORPHONE HYDROCHLORIDE 2 MG/ML
0.5 INJECTION INTRAMUSCULAR; INTRAVENOUS; SUBCUTANEOUS
Refills: 0 | Status: DISCONTINUED | OUTPATIENT
Start: 2020-05-28 | End: 2020-05-29

## 2020-05-28 RX ORDER — CITRIC ACID/SODIUM CITRATE 300-500 MG
15 SOLUTION, ORAL ORAL EVERY 6 HOURS
Refills: 0 | Status: DISCONTINUED | OUTPATIENT
Start: 2020-05-28 | End: 2020-05-28

## 2020-05-28 RX ORDER — HYDROMORPHONE HYDROCHLORIDE 2 MG/ML
0.5 INJECTION INTRAMUSCULAR; INTRAVENOUS; SUBCUTANEOUS ONCE
Refills: 0 | Status: DISCONTINUED | OUTPATIENT
Start: 2020-05-28 | End: 2020-05-28

## 2020-05-28 RX ORDER — SODIUM CHLORIDE 9 MG/ML
1000 INJECTION, SOLUTION INTRAVENOUS
Refills: 0 | Status: DISCONTINUED | OUTPATIENT
Start: 2020-05-28 | End: 2020-05-29

## 2020-05-28 RX ORDER — OXYTOCIN 10 UNIT/ML
4 VIAL (ML) INJECTION
Qty: 30 | Refills: 0 | Status: DISCONTINUED | OUTPATIENT
Start: 2020-05-28 | End: 2020-05-28

## 2020-05-28 RX ORDER — ALBUMIN HUMAN 25 %
1000 VIAL (ML) INTRAVENOUS ONCE
Refills: 0 | Status: DISCONTINUED | OUTPATIENT
Start: 2020-05-28 | End: 2020-05-28

## 2020-05-28 RX ORDER — SODIUM CHLORIDE 9 MG/ML
1000 INJECTION INTRAMUSCULAR; INTRAVENOUS; SUBCUTANEOUS
Refills: 0 | Status: DISCONTINUED | OUTPATIENT
Start: 2020-05-28 | End: 2020-05-28

## 2020-05-28 RX ORDER — MAGNESIUM SULFATE 500 MG/ML
2 VIAL (ML) INJECTION
Refills: 0 | Status: COMPLETED | OUTPATIENT
Start: 2020-05-28 | End: 2020-05-28

## 2020-05-28 RX ORDER — CHLORHEXIDINE GLUCONATE 213 G/1000ML
1 SOLUTION TOPICAL
Refills: 0 | Status: DISCONTINUED | OUTPATIENT
Start: 2020-05-29 | End: 2020-06-02

## 2020-05-28 RX ORDER — ACETAMINOPHEN 500 MG
1000 TABLET ORAL EVERY 6 HOURS
Refills: 0 | Status: COMPLETED | OUTPATIENT
Start: 2020-05-29 | End: 2020-05-29

## 2020-05-28 RX ORDER — OXYTOCIN 10 UNIT/ML
333.33 VIAL (ML) INJECTION
Qty: 20 | Refills: 0 | Status: COMPLETED | OUTPATIENT
Start: 2020-05-28 | End: 2020-05-28

## 2020-05-28 RX ORDER — POTASSIUM CHLORIDE 20 MEQ
20 PACKET (EA) ORAL
Refills: 0 | Status: COMPLETED | OUTPATIENT
Start: 2020-05-28 | End: 2020-05-29

## 2020-05-28 RX ADMIN — HYDROMORPHONE HYDROCHLORIDE 0.5 MILLIGRAM(S): 2 INJECTION INTRAMUSCULAR; INTRAVENOUS; SUBCUTANEOUS at 21:01

## 2020-05-28 RX ADMIN — PROPOFOL 31.4 MICROGRAM(S)/KG/MIN: 10 INJECTION, EMULSION INTRAVENOUS at 21:01

## 2020-05-28 RX ADMIN — Medication 50 GRAM(S): at 21:55

## 2020-05-28 RX ADMIN — Medication 50 MILLIEQUIVALENT(S): at 21:54

## 2020-05-28 RX ADMIN — SODIUM CHLORIDE 125 MILLILITER(S): 9 INJECTION, SOLUTION INTRAVENOUS at 21:01

## 2020-05-28 RX ADMIN — Medication 10 UNIT(S): at 13:26

## 2020-05-28 RX ADMIN — Medication 0.2 MILLIGRAM(S): at 13:29

## 2020-05-28 RX ADMIN — Medication 1000 MILLIUNIT(S)/MIN: at 13:25

## 2020-05-28 RX ADMIN — Medication 50 GRAM(S): at 23:06

## 2020-05-28 RX ADMIN — Medication 4 MILLIUNIT(S)/MIN: at 05:54

## 2020-05-28 RX ADMIN — Medication 50 GRAM(S): at 21:02

## 2020-05-28 NOTE — PROGRESS NOTE ADULT - SUBJECTIVE AND OBJECTIVE BOX
Responded to MTP called in L&D  34 year old s/p  today, now returned to OR for likely perforated uterus and rapid postpartum bleeding  On arrival patient intubated  OB team performing laparotomy, seem to have control of bleeding from uterus  Anesthesia with good access, MTP in progress, hemodynamics stabilizing  General surgery attending scrubbed and assisting OB with laparotomy    - Arranged postop SICU bed, discussed with anesthesia  - No need for additional trauma surgery assistance

## 2020-05-28 NOTE — PRE-ANESTHESIA EVALUATION ADULT - NSANTHOSAYNRD_GEN_A_CORE
No. HOME screening performed.  STOP BANG Legend: 0-2 = LOW Risk; 3-4 = INTERMEDIATE Risk; 5-8 = HIGH Risk

## 2020-05-28 NOTE — PROGRESS NOTE ADULT - SUBJECTIVE AND OBJECTIVE BOX
RIJ Cordis placed intraoperatively. CXR obtained post op and reviewed. Cordis and ETT in correct position. RIJ Cordis placed intraoperatively. CXR obtained post op and reviewed. Cordis in correct position. ETT in situ. RIJ Cordis placed intraoperatively. CXR obtained post op and reviewed. Cordis in correct position. ETT in situ.    Estimated EBL [phone] intraop

## 2020-05-28 NOTE — CONSULT NOTE ADULT - ATTENDING COMMENTS
- 33 yo f. Post partum with uterine rupture. S/p TAM, right salpingooophorectomy, right ureterolysis and cystoscopy. MTP in the OR, 11U FFP, 8U FFP, 1U cryo, 1U platelets. Transferred to SICU for further resuscitation and ventilator management.  - Weaning propofol, plan for extubation.  - Minimal ventilator settings. No changes.  - Lactate clearing, continue with serial labs. TEG pending.  - No pressor requirements.  - Responding to resuscitation.

## 2020-05-28 NOTE — PROVIDER CONTACT NOTE (CRITICAL VALUE NOTIFICATION) - NAME OF MD/NP/PA/DO NOTIFIED:
Dr. Rod and JAMILAH Flores
Dr. Rod and JAMILAH Flores
Dr. Trevino
Dr. oRd and LILLIE Flores
JAMILAH Flores

## 2020-05-28 NOTE — BRIEF OPERATIVE NOTE - OPERATION/FINDINGS
Exam under anesthesia:  notable cervical laceration however unable to see apex.  increased bleeding noted. unable to delineate source.   no sulcal tears noted.   Decision made to proceed with ex-lap due to palpable uterine defect.    Ex-lap revelead R lateral wall uterine rupture with extension into broad ligament with active bleeding.  Cervical laceration extending into lateral wall rupture.   Surgically absent L fallopian tube.  Grossly normal bilateral ovaries.  Hemoperitoneum.   Unremarkable upper abdomen.     Fibrillar and eviceal placed in bladder flap and over vaginal cuff.

## 2020-05-28 NOTE — BRIEF OPERATIVE NOTE - NSICDXBRIEFPROCEDURE_GEN_ALL_CORE_FT
PROCEDURES:  Total abdominal hysterectomy with salpingectomy and cystoscopy 28-May-2020 19:21:01  Leslie De  Cystoscopy 28-May-2020 19:16:15  Leslie De  Exam under anesthesia, vagina 28-May-2020 19:16:08  Leslie De  Right salpingectomy 28-May-2020 19:15:39  Leslie De  Exploratory laparotomy 28-May-2020 19:15:27  Leslie De

## 2020-05-28 NOTE — PROVIDER CONTACT NOTE (CRITICAL VALUE NOTIFICATION) - SITUATION
MD notified during pph and emergency surgery. See OB progress note for details
MD notified during pph and emergency surgery. See OB progress note for details on action

## 2020-05-28 NOTE — PRE-ANESTHESIA EVALUATION ADULT - NSANTHADDINFOFT_GEN_ALL_CORE
Emergency procedure. Unable to consent prior to starting case due to excessive amount of bleeding. Case discussed with OB team.

## 2020-05-28 NOTE — CONSULT NOTE ADULT - SUBJECTIVE AND OBJECTIVE BOX
SURGICAL INTENSIVE CARE UNIT CONSULT NOTE    Consulting Attending: Dr. DANIKA Gardner      HISTORY OF PRESENT ILLNESS:   Renae Kirby is a 34 year old F with a pmhx of a ruptured ectopic pregnancy s/p laparoscopic left salpingectomy (12/2018) who is s/p a normal spontaneous vaginal delivery complicated by post-partum hemorrhage. The patient underwent a bimanual exam which demonstrated a uterine perforation as the patient's cul-de-sac was palpable on exam. A massive transfusion protocol was initiated and the patient was emergently taken to the OR by OB-GYN and General Surgery for an exploratory laparotomy. At this time, the patient had already lost 0.8 L of blood during the delivery. The patient was found to be in significant lactic acidosis (lactate 14.2, pH 7.10). A RIJ cordis was placed and the patient was started on pressors and multiple blood products. The patient also received multiple doses of calcium chloride, sodium bicarbonate x1 and a TxA 1g x1.     The patient underwent an exploratory laparotomy wherein a spontaneous right lateral uterine wall rupture was identified contiguous with a cervical laceration and extending into the broad ligament. The patient underwent a total abdominal hysterectomy, right salpingectomy, right ureterolysis with completion cystoscopy.     Intra-operatively, the patient received pRBC x 11 units, FFP x 8 units, PLTs x 2 units, Cryo x 1 unit, IV fluid (Crystalloid) x 7.0 L, 5% Albumin x 1.0 L. UOP 4.3 L; EBL 3.5 L. The patient was brought to the SICU in the post-operative period    NEURO  RASS:     GCS:     CAM ICU:  Exam:   Meds:   [x] Adequacy of sedation and pain control has been assessed and adjusted      RESPIRATORY  RR: 25 (05-28-20 @ 20:00) (20 - 39)  SpO2: 100% (05-28-20 @ 20:16) (100% - 100%)  Wt(kg): --  Exam: unlabored, clear to auscultation bilaterally  Mechanical Ventilation: Mode: AC/ CMV (Assist Control/ Continuous Mandatory Ventilation), RR (machine): 20, RR (patient): 24, TV (machine): 400, FiO2: 40, PEEP: 5, ITime: 1, MAP: 8, PIP: 21  ABG - ( 28 May 2020 20:18 )  pH: 7.40  /  pCO2: 39    /  pO2: 129   / HCO3: 24    / Base Excess: -.2   /  SaO2: 99      Lactate: x                [ ] Extubation Readiness Assessed  Meds:       CARDIOVASCULAR  HR: 109 (05-28-20 @ 20:16) (96 - 122)  BP: 188/90 (05-28-20 @ 19:45) (120/76 - 188/90)  BP(mean): 129 (05-28-20 @ 19:45) (129 - 129)  ABP: 201/188 (05-28-20 @ 20:00) (0/0 - 201/188)  ABP(mean): 124 (05-28-20 @ 20:00) (0 - 124)  Wt(kg): --  CVP(cm H2O): --    Lactate, Blood: 7.0 mmol/L (05-28 @ 16:22)    Exam:  Cardiac Rhythm:  Perfusion     [ ]Adequate   [ ]Inadequate  Mentation   [ ]Normal       [ ]Reduced  Extremities  [ ]Warm         [ ]Cool  Volume Status [ ]Hypervolemic [ ]Euvolemic [ ]Hypovolemic  Meds:       GI/NUTRITION  Exam:  Diet:  Meds:     GENITOURINARY  I&O's Detail    05-28 @ 07:01  -  05-28 @ 20:20  --------------------------------------------------------  IN:  Total IN: 0 mL    OUT:    Estimated Blood Loss: 3500 mL  Total OUT: 3500 mL    Total NET: -3500 mL        Weight (kg): 104.8 (05-28 @ 15:21)  05-28    140  |  104  |  9   ----------------------------<  209<H>  4.3   |  17<L>  |  0.52    Ca    9.4      28 May 2020 16:22    TPro  5.0<L>  /  Alb  3.2<L>  /  TBili  0.8  /  DBili  x   /  AST  21  /  ALT  19  /  AlkPhos  66  05-28    [ ] Cano catheter, indication: N/A  Meds:       HEMATOLOGIC  Meds:   [x] VTE Prophylaxis                        10.7   16.12 )-----------( 122      ( 28 May 2020 16:22 )             32.1     PT/INR - ( 28 May 2020 16:22 )   PT: 12.0 sec;   INR: 1.04 ratio         PTT - ( 28 May 2020 16:22 )  PTT:23.9 sec  Transfusion     [ ] PRBC   [ ] Platelets   [ ] FFP   [ ] Cryoprecipitate      INFECTIOUS DISEASES  T(C): 36 (05-28-20 @ 19:45), Max: 37 (05-28-20 @ 15:16)  Wt(kg): --  WBC Count: 16.12 K/uL (05-28 @ 16:22)  WBC Count: 38.36 K/uL (05-28 @ 13:48)  WBC Count: 12.71 K/uL (05-28 @ 04:45)    Recent Cultures:    Meds:       ENDOCRINE  Capillary Blood Glucose    Meds:       ACCESS DEVICES:  [ ] Peripheral IV  [ ] Central Venous Line	[ ] R	[ ] L	[ ] IJ	[ ] Fem	[ ] SC	Placed:   [ ] Arterial Line		[ ] R	[ ] L	[ ] Fem	[ ] Rad	[ ] Ax	Placed:   [ ] PICC:					[ ] Mediport  [ ] Urinary Catheter, Date Placed:   [ ] Necessity of urinary, arterial, and venous catheters discussed    OTHER MEDICATIONS:  chlorhexidine 0.12% Liquid 15 milliLiter(s) Oral Mucosa every 12 hours      CODE STATUS:     IMAGING: SURGICAL INTENSIVE CARE UNIT CONSULT NOTE    Consulting Attending: Dr. DANIKA Gardner    History of Present Illness:  Renae Kirby is a 34 year old F with a pmhx of a ruptured ectopic pregnancy s/p laparoscopic left salpingectomy (12/2018) who is s/p a normal spontaneous vaginal delivery complicated by post-partum hemorrhage. The patient underwent a bimanual exam which demonstrated a uterine perforation as the patient's cul-de-sac was palpable on exam. A massive transfusion protocol was initiated and the patient was emergently taken to the OR by OB-GYN and General Surgery for an exploratory laparotomy. At this time, the patient had already lost 0.8 L of blood during the delivery. The patient was found to be in significant lactic acidosis (lactate 14.2, pH 7.10). A RIJ cordis was placed and the patient was started on pressors and multiple blood products. The patient also received multiple doses of calcium chloride, sodium bicarbonate x1 and a TxA 1g x1.     The patient underwent an exploratory laparotomy wherein a spontaneous right lateral uterine wall rupture was identified contiguous with a cervical laceration and extending into the broad ligament. The patient underwent a total abdominal hysterectomy, right salpingectomy, right ureterolysis with completion cystoscopy.     Intra-operatively, the patient received pRBC x 11 units, FFP x 8 units, PLTs x 2 units, Cryo x 1 unit, IV fluid (Crystalloid) x 7.0 L, 5% Albumin x 1.0 L. UOP 4.3 L; EBL 3.5 L. The patient was brought to the SICU in the post-operative period for further cares and hemodynamic monitoring.      NEURO  Exam: Sedated on propofol  Meds: HYDROmorphone  Injectable 0.5 milliGRAM(s) IV Push every 3 hours PRN Severe Pain (7 - 10)  propofol Infusion 50 MICROgram(s)/kG/Min IV Continuous <Continuous>  [x] Adequacy of sedation and pain control has been assessed and adjusted      RESPIRATORY  RR: 19 (05-28-20 @ 23:00) (17 - 39)  SpO2: 100% (05-28-20 @ 23:00) (100% - 100%)  Wt(kg): --  Exam: unlabored, clear to auscultation bilaterally  Mechanical Ventilation: Mode: AC/ CMV (Assist Control/ Continuous Mandatory Ventilation), RR (machine): 20, RR (patient): 24, TV (machine): 400, FiO2: 40, PEEP: 5, ITime: 1, MAP: 8, PIP: 21  ABG - ( 28 May 2020 20:18 )  pH: 7.40  /  pCO2: 39    /  pO2: 129   / HCO3: 24    / Base Excess: -.2   /  SaO2: 99      Lactate: x      [x] Extubation Readiness Assessed  Meds:       CARDIOVASCULAR  HR: 96 (05-28-20 @ 23:00) (85 - 122)  BP: 188/90 (05-28-20 @ 19:45) (120/76 - 188/90)  BP(mean): 129 (05-28-20 @ 19:45) (129 - 129)  ABP: 157/68 (05-28-20 @ 23:00) (0/0 - 208/88)  ABP(mean): 94 (05-28-20 @ 23:00) (0 - 124)  Wt(kg): --  CVP(cm H2O): --  Lactate, Blood: 7.0 mmol/L (05-28 @ 16:22)  Exam: S1 S2 no MGR  Cardiac Rhythm: Normal sinus rhythm  Perfusion     [ ]Adequate   [x]Inadequate  Mentation   [ ]Normal       [x]Reduced  Extremities  [x]Warm         [ ]Cool  Volume Status [x]Hypervolemic [ ]Euvolemic [ ]Hypovolemic  Meds:       GI/NUTRITION  Exam: Soft, non-distended, laparotomy incision clean, dry and intact.  Diet: NPO  Meds:     GENITOURINARY  I&O's Detail    05-28 @ 07:01  -  05-28 @ 23:03  --------------------------------------------------------  IN:    IV PiggyBack: 150 mL    lactated ringers.: 375 mL    propofol Infusion: 88.1 mL    Solution: 100 mL  Total IN: 713.1 mL    OUT:    Estimated Blood Loss: 3500 mL    Indwelling Catheter - Urethral: 1535 mL  Total OUT: 5035 mL    Total NET: -4321.9 mL        Weight (kg): 104.8 (05-28 @ 15:21)  05-28    140  |  102  |  7   ----------------------------<  143<H>  3.5   |  21<L>  |  0.45<L>    Ca    10.4      28 May 2020 20:15  Phos  4.7     05-28  Mg     1.3     05-28    TPro  6.4  /  Alb  4.4  /  TBili  1.4<H>  /  DBili  x   /  AST  28  /  ALT  21  /  AlkPhos  67  05-28    [x] Cano catheter, indication: Critical illness  Meds: lactated ringers. 1000 milliLiter(s) IV Continuous <Continuous>  magnesium sulfate  IVPB 2 Gram(s) IV Intermittent every 1 hour  potassium chloride  20 mEq/100 mL IVPB 20 milliEquivalent(s) IV Intermittent every 2 hours      HEMATOLOGIC  Meds:   [ ] VTE Prophylaxis                        12.1   15.37 )-----------( 117      ( 28 May 2020 20:15 )             34.6     PT/INR - ( 28 May 2020 20:15 )   PT: 11.6 sec;   INR: 1.01 ratio         PTT - ( 28 May 2020 20:15 )  PTT:25.1 sec  Transfusion     [ ] PRBC   [ ] Platelets   [ ] FFP   [ ] Cryoprecipitate      INFECTIOUS DISEASES  T(C): 36 (05-28-20 @ 19:45), Max: 37 (05-28-20 @ 15:16)  Wt(kg): --  WBC Count: 15.37 K/uL (05-28 @ 20:15)  WBC Count: 16.12 K/uL (05-28 @ 16:22)  WBC Count: 38.36 K/uL (05-28 @ 13:48)  WBC Count: 12.71 K/uL (05-28 @ 04:45)    Recent Cultures:    Meds:       ENDOCRINE  Capillary Blood Glucose    Meds:       ACCESS DEVICES:  [x] Peripheral IV  [x] Central Venous Line	[x] R	[ ] L	[x] IJ	[ ] Fem	[ ] SC	Placed: Cordis, 05/28/2020  [x] Arterial Line		[ ] R	[x] L	[ ] Fem	[x] Rad	[ ] Ax	Placed:  05/28/2020  [ ] PICC:					[ ] Mediport  [x] Urinary Catheter, Date Placed: 05/28/2020  [x] Necessity of urinary, arterial, and venous catheters discussed    OTHER MEDICATIONS:  chlorhexidine 0.12% Liquid 15 milliLiter(s) Oral Mucosa every 12 hours    CODE STATUS: Full code, presumed    IMAGING:  < from: Xray Chest 1 View- PORTABLE-Urgent (05.28.20 @ 21:13) >  INTERPRETATION:  ET tube tip above the daniele.    < from: Xray Chest 1 View-PORTABLE IMMEDIATE (05.28.20 @ 19:01) >  IMPRESSION: Endotracheal tube terminates in the right mainstem bronchus.  No radiopaque foreign body representing a lap pad in the chest, abdomen and pelvis.  < end of copied text >

## 2020-05-28 NOTE — CONSULT NOTE ADULT - ASSESSMENT
SANJUANA Kirby is a 34 year old F with a pmhx of an ectopic pregnancy requiring surgical management (12/2018) who is now s/p ex-lap, TAM, right salpingectomy, ureterolysis with cystoscopy for a spontaneous uterine perforation and post-partum hemorrhage sustained during vaginal delivery. The patient sustained significant blood loss, with resultant hypoperfusion/lactic acidosis, requiring massive transfusion of blood products and crystalloid. The patient was transferred to the SICU post-operatively for hemodynamic monitoring and resuscitation.     PLAN BY SYSTEMS  Neurological:    -- Propofol @ 65  -- Wean sedation in anticipation for extubation  -- Tylenol and PRN Dilaudid for pain control.       Pulmonary:  -- PRVC: 20/400/5/40%  -- Weaning off ventilation, possible SBT    Cardiovascular:  -- Hypertensive on arrival to the SICU  -- No pressor requirements  -- Lactate downtrending. Adequately resuscitated on critical care U/S.    Renal:  -- Cr 0.45, adequate UOP  -- Cano, strict I's and O's.   -- Replete electrolytes PRN  -- Lactic acidosis, resolving. Normalized blood gas.    Hematological: s/p massive transfusion protocol  -- Q4H CBC's and ABG's in early resuscitative period.  -- VTE ppx on hold for now, to be initiated provided stable CBCs    Infectious Disease:  -- Afebrile, WBC elevated.  -- Likely reactive leukocytosis  -- Continue to monitor    GI:  -- Diet: NPO  -- PPI: Protonix 40 mg QD    Endocrine:  -- No active issues, continue to monitor.     Access:    Right IJ Cordis (5/28)  Left radial A-line (5/28)  Cano catheter (5/28)    FULL CODE, discussed.    [facility] [id number] SANJUANA Kirby is a 34 year old F with a pmhx of an ectopic pregnancy requiring surgical management (12/2018) who is now s/p ex-lap, TAM, right salpingectomy, ureterolysis with cystoscopy for a spontaneous uterine perforation and post-partum hemorrhage sustained during vaginal delivery. The patient sustained significant blood loss, with resultant hypoperfusion/lactic acidosis, requiring massive transfusion of blood products and crystalloid. The patient was transferred to the SICU post-operatively for hemodynamic monitoring and resuscitation.     PLAN BY SYSTEMS  Neurological:    -- Propofol @ 65  -- Wean sedation in anticipation for extubation  -- Tylenol and PRN Dilaudid for pain control.       Pulmonary:  -- PRVC: 20/400/5/40%  -- Weaning off ventilation, possible SBT    Cardiovascular:  -- Hypertensive on arrival to the SICU  -- No pressor requirements  -- Lactate downtrending. Adequately resuscitated on critical care U/S.    Renal:  -- Cr 0.45, adequate UOP  -- Cano, strict I's and O's.   -- Replete electrolytes PRN  -- Lactic acidosis, resolving. Normalized blood gas.    Hematological: s/p massive transfusion protocol  -- Q4H CBC's and ABG's in early resuscitative period.  -- VTE ppx on hold for now, to be initiated provided stable CBCs    Infectious Disease:  -- Afebrile, WBC elevated.  -- Likely reactive leukocytosis  -- Continue to monitor    GI:  -- Diet: NPO  -- PPI: Protonix 40 mg QD    Endocrine:  -- No active issues, continue to monitor.     Access:    Right IJ Cordis (5/28)  Left radial A-line (5/28)  Cano catheter (5/28)    FULL CODE, discussed.    [facility] [id number]

## 2020-05-28 NOTE — PROGRESS NOTE ADULT - SUBJECTIVE AND OBJECTIVE BOX
Anesthesia Event Note:    Called to L&D for code 100. OB Anesthesiology team available. L 18G PIV placed by anesthesiology team in L&D. Patient brought to OR. Standard ASA monitors placed. Patient preoxygenated and then induced with 200mg propofol and 200mg succinylcholine. RSI. Easy intubation. Cormack Lehane Grade 1 view obtained. 7.5cm ETT taped at 22cm at lip.  800cc EBL in L&D after delivery.    Called for backup from Anesthesiology team. Right 18G PIV placed. Infiltrated R18G PIV removed. Left radial A-line placed in sterile fashion.  Massive transfusion protocol called.    Under ultrasound and sterile conditions, RIJ Cordis with MAC catheter placed by Anesthesiology team. Manometry negative and Central venous gas negative for arterial cannulation. Normal CVP tracing obtained    Patient started on phenylephrine gtt but once central line obtained, norepinephrine infusion started.    MTP and tranfusion dictated by various ABG's, labs, and TEG.  Patient hemodynamically stable throughout and making adequate urine output.    Lactate elevated initially but downtrending. Calcium chloride given at multiple intervals and sodium bicarbonate amp given x1. TXA 1000mg given.    EUA performed by surgical team followed by ex lap by surgical team and hysterectomy subsequently.    Spoke to SICU team. Will transfer to ICU intubated after procedure completed. Anesthesia Event Note:    Called to L&D for code 100. OB Anesthesiology team available. L 18G PIV placed by anesthesiology team in L&D. Patient brought to OR. Standard ASA monitors placed. Patient preoxygenated and then induced with 200mg propofol and 200mg succinylcholine. RSI. Easy intubation. Cormack Lehane Grade 1 view obtained. 7.5cm ETT taped at 22cm at lip.  800cc EBL in L&D after delivery.    Called for backup from Anesthesiology team. Right 18G PIV placed. Infiltrated R18G PIV removed. Left radial A-line placed in sterile fashion.  Massive transfusion protocol called.    Under ultrasound and sterile conditions, RIJ Cordis with MAC catheter placed by Anesthesiology team. Manometry negative and Central venous gas negative for arterial cannulation. Normal CVP tracing obtained    Patient started on phenylephrine gtt but once central line obtained, norepinephrine infusion started.    MTP and tranfusion dictated by various ABG's, labs, and TEG. Pt. making adequate urine output.     Lactate elevated initially but downtrending. Calcium chloride given at multiple intervals and sodium bicarbonate amp given x1. TXA 1000mg given.    EUA performed by surgical team followed by ex lap by surgical team and hysterectomy subsequently.    Spoke to SICU team. Will transfer to ICU intubated after procedure completed.

## 2020-05-28 NOTE — CONSULT NOTE ADULT - ASSESSMENT
33 y/o s/p vagnal delivery complicated by hemorrhage likely secondary to posterior vaginal tear, general surgery consulted for possible ex. lap to manage hemorrhage     - general surgery available if ex. lap required   - monitor H/H   - transfuse as needed   - care per OB/GYN team     Discussed with fellow and chief resident     Richi Cervantes PGY2  [phone]

## 2020-05-29 DIAGNOSIS — Z98.890 OTHER SPECIFIED POSTPROCEDURAL STATES: ICD-10-CM

## 2020-05-29 LAB
ALBUMIN SERPL ELPH-MCNC: 3.5 G/DL — SIGNIFICANT CHANGE UP (ref 3.3–5)
ALBUMIN SERPL ELPH-MCNC: 3.9 G/DL — SIGNIFICANT CHANGE UP (ref 3.3–5)
ALP SERPL-CCNC: 64 U/L — SIGNIFICANT CHANGE UP (ref 40–120)
ALP SERPL-CCNC: 69 U/L — SIGNIFICANT CHANGE UP (ref 40–120)
ALT FLD-CCNC: 19 U/L — SIGNIFICANT CHANGE UP (ref 10–45)
ALT FLD-CCNC: 19 U/L — SIGNIFICANT CHANGE UP (ref 10–45)
ANION GAP SERPL CALC-SCNC: 12 MMOL/L — SIGNIFICANT CHANGE UP (ref 5–17)
ANION GAP SERPL CALC-SCNC: 14 MMOL/L — SIGNIFICANT CHANGE UP (ref 5–17)
APTT BLD: 20 SEC — LOW (ref 27.5–36.3)
APTT BLD: 23.1 SEC — LOW (ref 27.5–36.3)
AST SERPL-CCNC: 28 U/L — SIGNIFICANT CHANGE UP (ref 10–40)
AST SERPL-CCNC: 29 U/L — SIGNIFICANT CHANGE UP (ref 10–40)
BASOPHILS # BLD AUTO: 0.05 K/UL — SIGNIFICANT CHANGE UP (ref 0–0.2)
BASOPHILS NFR BLD AUTO: 0.3 % — SIGNIFICANT CHANGE UP (ref 0–2)
BILIRUB DIRECT SERPL-MCNC: 0.2 MG/DL — SIGNIFICANT CHANGE UP (ref 0–0.2)
BILIRUB INDIRECT FLD-MCNC: 0.6 MG/DL — SIGNIFICANT CHANGE UP (ref 0.2–1)
BILIRUB SERPL-MCNC: 0.6 MG/DL — SIGNIFICANT CHANGE UP (ref 0.2–1.2)
BILIRUB SERPL-MCNC: 0.8 MG/DL — SIGNIFICANT CHANGE UP (ref 0.2–1.2)
BUN SERPL-MCNC: 5 MG/DL — LOW (ref 7–23)
BUN SERPL-MCNC: 5 MG/DL — LOW (ref 7–23)
CALCIUM SERPL-MCNC: 8.6 MG/DL — SIGNIFICANT CHANGE UP (ref 8.4–10.5)
CALCIUM SERPL-MCNC: 8.9 MG/DL — SIGNIFICANT CHANGE UP (ref 8.4–10.5)
CHLORIDE SERPL-SCNC: 101 MMOL/L — SIGNIFICANT CHANGE UP (ref 96–108)
CHLORIDE SERPL-SCNC: 101 MMOL/L — SIGNIFICANT CHANGE UP (ref 96–108)
CO2 SERPL-SCNC: 21 MMOL/L — LOW (ref 22–31)
CO2 SERPL-SCNC: 22 MMOL/L — SIGNIFICANT CHANGE UP (ref 22–31)
CREAT SERPL-MCNC: 0.42 MG/DL — LOW (ref 0.5–1.3)
CREAT SERPL-MCNC: 0.43 MG/DL — LOW (ref 0.5–1.3)
EOSINOPHIL # BLD AUTO: 0.03 K/UL — SIGNIFICANT CHANGE UP (ref 0–0.5)
EOSINOPHIL NFR BLD AUTO: 0.2 % — SIGNIFICANT CHANGE UP (ref 0–6)
FIBRINOGEN PPP-MCNC: 471 MG/DL — SIGNIFICANT CHANGE UP (ref 350–510)
FIBRINOGEN PPP-MCNC: 660 MG/DL — HIGH (ref 350–510)
GAS PNL BLDA: SIGNIFICANT CHANGE UP
GAS PNL BLDA: SIGNIFICANT CHANGE UP
GLUCOSE SERPL-MCNC: 132 MG/DL — HIGH (ref 70–99)
GLUCOSE SERPL-MCNC: 135 MG/DL — HIGH (ref 70–99)
HCT VFR BLD CALC: 29.8 % — LOW (ref 34.5–45)
HCT VFR BLD CALC: 31.4 % — LOW (ref 34.5–45)
HCT VFR BLD CALC: 35.4 % — SIGNIFICANT CHANGE UP (ref 34.5–45)
HEPARINASE TEG R TIME: 4.2 MIN — SIGNIFICANT CHANGE UP (ref 4.3–8.3)
HGB BLD-MCNC: 10.6 G/DL — LOW (ref 11.5–15.5)
HGB BLD-MCNC: 11 G/DL — LOW (ref 11.5–15.5)
HGB BLD-MCNC: 11.8 G/DL — SIGNIFICANT CHANGE UP (ref 11.5–15.5)
IMM GRANULOCYTES NFR BLD AUTO: 0.6 % — SIGNIFICANT CHANGE UP (ref 0–1.5)
INR BLD: 0.92 RATIO — SIGNIFICANT CHANGE UP (ref 0.88–1.16)
INR BLD: 0.97 RATIO — SIGNIFICANT CHANGE UP (ref 0.88–1.16)
LDH SERPL L TO P-CCNC: 215 U/L — SIGNIFICANT CHANGE UP (ref 50–242)
LYMPHOCYTES # BLD AUTO: 14.9 % — SIGNIFICANT CHANGE UP (ref 13–44)
LYMPHOCYTES # BLD AUTO: 2.44 K/UL — SIGNIFICANT CHANGE UP (ref 1–3.3)
MAGNESIUM SERPL-MCNC: 2.4 MG/DL — SIGNIFICANT CHANGE UP (ref 1.6–2.6)
MCHC RBC-ENTMCNC: 28.1 PG — SIGNIFICANT CHANGE UP (ref 27–34)
MCHC RBC-ENTMCNC: 28.6 PG — SIGNIFICANT CHANGE UP (ref 27–34)
MCHC RBC-ENTMCNC: 29 PG — SIGNIFICANT CHANGE UP (ref 27–34)
MCHC RBC-ENTMCNC: 33.3 GM/DL — SIGNIFICANT CHANGE UP (ref 32–36)
MCHC RBC-ENTMCNC: 35 GM/DL — SIGNIFICANT CHANGE UP (ref 32–36)
MCHC RBC-ENTMCNC: 35.6 GM/DL — SIGNIFICANT CHANGE UP (ref 32–36)
MCV RBC AUTO: 81.4 FL — SIGNIFICANT CHANGE UP (ref 80–100)
MCV RBC AUTO: 81.6 FL — SIGNIFICANT CHANGE UP (ref 80–100)
MCV RBC AUTO: 84.3 FL — SIGNIFICANT CHANGE UP (ref 80–100)
MONOCYTES # BLD AUTO: 1.01 K/UL — HIGH (ref 0–0.9)
MONOCYTES NFR BLD AUTO: 6.2 % — SIGNIFICANT CHANGE UP (ref 2–14)
NEUTROPHILS # BLD AUTO: 12.72 K/UL — HIGH (ref 1.8–7.4)
NEUTROPHILS NFR BLD AUTO: 77.8 % — HIGH (ref 43–77)
NRBC # BLD: 0 /100 WBCS — SIGNIFICANT CHANGE UP (ref 0–0)
PHOSPHATE SERPL-MCNC: 3.4 MG/DL — SIGNIFICANT CHANGE UP (ref 2.5–4.5)
PLATELET # BLD AUTO: 116 K/UL — LOW (ref 150–400)
PLATELET # BLD AUTO: 117 K/UL — LOW (ref 150–400)
PLATELET # BLD AUTO: 129 K/UL — LOW (ref 150–400)
PLATELET MAPPING ACTF MAX AMPLITUDE: 13.7 MM — SIGNIFICANT CHANGE UP (ref 2–19)
PLATELET MAPPING ADP MAXIMUM AMPLITUDE: 41.8 MM — SIGNIFICANT CHANGE UP (ref 45–69)
PLATELET MAPPING ADP PERCENT INHIBITION: 38.6 % — SIGNIFICANT CHANGE UP (ref 0–17)
PLATELET MAPPING HKH MAXIMUM AMPLITUDE: 59.5 MM — SIGNIFICANT CHANGE UP (ref 53–68)
POTASSIUM SERPL-MCNC: 3.7 MMOL/L — SIGNIFICANT CHANGE UP (ref 3.5–5.3)
POTASSIUM SERPL-MCNC: 3.8 MMOL/L — SIGNIFICANT CHANGE UP (ref 3.5–5.3)
POTASSIUM SERPL-SCNC: 3.7 MMOL/L — SIGNIFICANT CHANGE UP (ref 3.5–5.3)
POTASSIUM SERPL-SCNC: 3.8 MMOL/L — SIGNIFICANT CHANGE UP (ref 3.5–5.3)
PROT SERPL-MCNC: 5.9 G/DL — LOW (ref 6–8.3)
PROT SERPL-MCNC: 5.9 G/DL — LOW (ref 6–8.3)
PROTHROM AB SERPL-ACNC: 10.6 SEC — SIGNIFICANT CHANGE UP (ref 10–12.9)
PROTHROM AB SERPL-ACNC: 11.1 SEC — SIGNIFICANT CHANGE UP (ref 10–12.9)
RAPIDTEG MAXIMUM AMPLITUDE: 59.5 MM — SIGNIFICANT CHANGE UP (ref 52–70)
RBC # BLD: 3.66 M/UL — LOW (ref 3.8–5.2)
RBC # BLD: 3.85 M/UL — SIGNIFICANT CHANGE UP (ref 3.8–5.2)
RBC # BLD: 4.2 M/UL — SIGNIFICANT CHANGE UP (ref 3.8–5.2)
RBC # FLD: 15.9 % — HIGH (ref 10.3–14.5)
RBC # FLD: 15.9 % — HIGH (ref 10.3–14.5)
RBC # FLD: 16.5 % — HIGH (ref 10.3–14.5)
SODIUM SERPL-SCNC: 135 MMOL/L — SIGNIFICANT CHANGE UP (ref 135–145)
SODIUM SERPL-SCNC: 136 MMOL/L — SIGNIFICANT CHANGE UP (ref 135–145)
TEG FUNCTIONAL FIBRINOGEN: 18 MM — SIGNIFICANT CHANGE UP (ref 15–32)
TEG MAXIMUM AMPLITUDE: 59.7 MM — SIGNIFICANT CHANGE UP (ref 52–69)
TEG REACTION TIME: 3.8 MIN — SIGNIFICANT CHANGE UP (ref 4.6–9.1)
URATE SERPL-MCNC: 4.3 MG/DL — SIGNIFICANT CHANGE UP (ref 2.5–7)
WBC # BLD: 12.91 K/UL — HIGH (ref 3.8–10.5)
WBC # BLD: 14.34 K/UL — HIGH (ref 3.8–10.5)
WBC # BLD: 16.34 K/UL — HIGH (ref 3.8–10.5)
WBC # FLD AUTO: 12.91 K/UL — HIGH (ref 3.8–10.5)
WBC # FLD AUTO: 14.34 K/UL — HIGH (ref 3.8–10.5)
WBC # FLD AUTO: 16.34 K/UL — HIGH (ref 3.8–10.5)

## 2020-05-29 RX ORDER — OXYCODONE HYDROCHLORIDE 5 MG/1
5 TABLET ORAL EVERY 4 HOURS
Refills: 0 | Status: DISCONTINUED | OUTPATIENT
Start: 2020-05-29 | End: 2020-05-31

## 2020-05-29 RX ORDER — OXYCODONE HYDROCHLORIDE 5 MG/1
10 TABLET ORAL EVERY 6 HOURS
Refills: 0 | Status: DISCONTINUED | OUTPATIENT
Start: 2020-05-29 | End: 2020-05-31

## 2020-05-29 RX ORDER — POLYETHYLENE GLYCOL 3350 17 G/17G
17 POWDER, FOR SOLUTION ORAL DAILY
Refills: 0 | Status: DISCONTINUED | OUTPATIENT
Start: 2020-05-29 | End: 2020-06-03

## 2020-05-29 RX ORDER — SENNA PLUS 8.6 MG/1
2 TABLET ORAL AT BEDTIME
Refills: 0 | Status: DISCONTINUED | OUTPATIENT
Start: 2020-05-29 | End: 2020-06-03

## 2020-05-29 RX ORDER — ACETAMINOPHEN 500 MG
1000 TABLET ORAL ONCE
Refills: 0 | Status: COMPLETED | OUTPATIENT
Start: 2020-05-29 | End: 2020-05-30

## 2020-05-29 RX ORDER — HEPARIN SODIUM 5000 [USP'U]/ML
10000 INJECTION INTRAVENOUS; SUBCUTANEOUS EVERY 12 HOURS
Refills: 0 | Status: DISCONTINUED | OUTPATIENT
Start: 2020-05-29 | End: 2020-06-03

## 2020-05-29 RX ORDER — HEPARIN SODIUM 5000 [USP'U]/ML
5000 INJECTION INTRAVENOUS; SUBCUTANEOUS EVERY 12 HOURS
Refills: 0 | Status: DISCONTINUED | OUTPATIENT
Start: 2020-05-29 | End: 2020-05-29

## 2020-05-29 RX ADMIN — OXYCODONE HYDROCHLORIDE 5 MILLIGRAM(S): 5 TABLET ORAL at 16:46

## 2020-05-29 RX ADMIN — HEPARIN SODIUM 10000 UNIT(S): 5000 INJECTION INTRAVENOUS; SUBCUTANEOUS at 18:20

## 2020-05-29 RX ADMIN — Medication 400 MILLIGRAM(S): at 00:00

## 2020-05-29 RX ADMIN — Medication 400 MILLIGRAM(S): at 11:00

## 2020-05-29 RX ADMIN — OXYCODONE HYDROCHLORIDE 5 MILLIGRAM(S): 5 TABLET ORAL at 12:16

## 2020-05-29 RX ADMIN — POLYETHYLENE GLYCOL 3350 17 GRAM(S): 17 POWDER, FOR SOLUTION ORAL at 18:22

## 2020-05-29 RX ADMIN — OXYCODONE HYDROCHLORIDE 5 MILLIGRAM(S): 5 TABLET ORAL at 18:38

## 2020-05-29 RX ADMIN — Medication 50 MILLIEQUIVALENT(S): at 00:00

## 2020-05-29 RX ADMIN — CHLORHEXIDINE GLUCONATE 1 APPLICATION(S): 213 SOLUTION TOPICAL at 05:26

## 2020-05-29 RX ADMIN — Medication 400 MILLIGRAM(S): at 05:11

## 2020-05-29 RX ADMIN — OXYCODONE HYDROCHLORIDE 10 MILLIGRAM(S): 5 TABLET ORAL at 23:28

## 2020-05-29 NOTE — PROGRESS NOTE ADULT - SUBJECTIVE AND OBJECTIVE BOX
POD#1   HD#2    Back note from 2pm due to clinical duties     Patient seen and examined at bedside with  Dr. Etienne. Overnight patient transferred to SICU intubated postoperatively. Initially patient hypertensive to 200/88 upon admission to SICU however after patient with adequate sedation and pain treatment BPs improved to 150s/90s. Patient was weaned off sedation and extubated. Upon exam at bedside patient saturating well on room air and sitting up comfortably in bed. Denies acute complaints. Pain well controlled with IV tyelnol and dilaudid prn. Patient has not yet been out of bed, Cano is in place and pt NPO at time of exam.   Denies HA, blurry vision, RUQ/epigastric pain. Denies CP, SOB, nausea, vomiting, fevers, and chills.    Vital Signs Last 24 Hours  T(C): 37 (05-28-20 @ 23:00), Max: 37 (05-28-20 @ 15:16)  HR: 93 (05-29-20 @ 04:00) (85 - 122)  BP: 188/90 (05-28-20 @ 19:45) (120/76 - 188/90)  RR: 23 (05-29-20 @ 04:00) (17 - 39)  SpO2: 98% (05-29-20 @ 04:00) (97% - 100%)    I&O's Summary    28 May 2020 07:01  -  29 May 2020 04:23  --------------------------------------------------------  IN: 1413.1 mL / OUT: 5935 mL / NET: -4521.9 mL        Physical Exam:  General: NAD  CV: NR, RR, S1, S2, no M/R/G  Lungs: CTA-B  Abdomen: Soft, appropriately tender, hypoactive bowel sounds, no rebound, no guarding   Incision: Midline vertical incision c/d/i with opsite dressing   Ext: Mild bilateral edema of LE, negative Carmen's bilaterally, SCDs in place and functioning     Labs:                        11.0   14.34 )-----------( 116      ( 29 May 2020 00:32 )             31.4     05-29    136  |  101  |  5<L>  ----------------------------<  132<H>  3.8   |  21<L>  |  0.43<L>    Ca    8.6      29 May 2020 01:46  Phos  3.4     05-29  Mg     2.4     05-29    TPro  5.9<L>  /  Alb  3.9  /  TBili  0.8  /  DBili  0.2  /  AST  29  /  ALT  19  /  AlkPhos  64  05-29    PT/INR - ( 29 May 2020 00:32 )   PT: 10.6 sec;   INR: 0.92 ratio         PTT - ( 29 May 2020 00:32 )  PTT:23.1 sec      Blood Type: B Negative      MEDICATIONS  (STANDING):  acetaminophen  IVPB .. 1000 milliGRAM(s) IV Intermittent every 6 hours  chlorhexidine 2% Cloths 1 Application(s) Topical <User Schedule>  polyethylene glycol 3350 17 Gram(s) Oral daily  prenatal multivitamin 1 Tablet(s) Oral daily  senna 2 Tablet(s) Oral at bedtime    MEDICATIONS  (PRN):  oxyCODONE    IR 5 milliGRAM(s) Oral every 4 hours PRN Moderate Pain (4 - 6)  oxyCODONE    IR 10 milliGRAM(s) Oral every 6 hours PRN Severe Pain (7 - 10) POD#1   HD#2    Back note from 2am due to clinical duties     Patient seen and examined at bedside with  Dr. Etienne. Overnight patient transferred to SICU intubated postoperatively. Initially patient hypertensive to 200/88 upon admission to SICU however after patient with adequate sedation and pain treatment BPs improved to 150s/90s. Patient was weaned off sedation and extubated. Upon exam at bedside patient saturating well on room air and sitting up comfortably in bed. Denies acute complaints. Pain well controlled with IV tyelnol and dilaudid prn. Patient has not yet been out of bed, Cano is in place and pt NPO at time of exam.   Denies HA, blurry vision, RUQ/epigastric pain. Denies CP, SOB, nausea, vomiting, fevers, and chills.    Vital Signs Last 24 Hours  T(C): 37 (05-28-20 @ 23:00), Max: 37 (05-28-20 @ 15:16)  HR: 93 (05-29-20 @ 04:00) (85 - 122)  BP: 188/90 (05-28-20 @ 19:45) (120/76 - 188/90)  RR: 23 (05-29-20 @ 04:00) (17 - 39)  SpO2: 98% (05-29-20 @ 04:00) (97% - 100%)    I&O's Summary    28 May 2020 07:01  -  29 May 2020 04:23  --------------------------------------------------------  IN: 1413.1 mL / OUT: 5935 mL / NET: -4521.9 mL        Physical Exam:  General: NAD  CV: NR, RR, S1, S2, no M/R/G  Lungs: CTA-B  Abdomen: Soft, appropriately tender, hypoactive bowel sounds, no rebound, no guarding   Incision: Midline vertical incision c/d/i with opsite dressing   Ext: Mild bilateral edema of LE, negative Carmen's bilaterally, SCDs in place and functioning     Labs:                        11.0   14.34 )-----------( 116      ( 29 May 2020 00:32 )             31.4     05-29    136  |  101  |  5<L>  ----------------------------<  132<H>  3.8   |  21<L>  |  0.43<L>    Ca    8.6      29 May 2020 01:46  Phos  3.4     05-29  Mg     2.4     05-29    TPro  5.9<L>  /  Alb  3.9  /  TBili  0.8  /  DBili  0.2  /  AST  29  /  ALT  19  /  AlkPhos  64  05-29    PT/INR - ( 29 May 2020 00:32 )   PT: 10.6 sec;   INR: 0.92 ratio         PTT - ( 29 May 2020 00:32 )  PTT:23.1 sec      Blood Type: B Negative      MEDICATIONS  (STANDING):  acetaminophen  IVPB .. 1000 milliGRAM(s) IV Intermittent every 6 hours  chlorhexidine 2% Cloths 1 Application(s) Topical <User Schedule>  polyethylene glycol 3350 17 Gram(s) Oral daily  prenatal multivitamin 1 Tablet(s) Oral daily  senna 2 Tablet(s) Oral at bedtime    MEDICATIONS  (PRN):  oxyCODONE    IR 5 milliGRAM(s) Oral every 4 hours PRN Moderate Pain (4 - 6)  oxyCODONE    IR 10 milliGRAM(s) Oral every 6 hours PRN Severe Pain (7 - 10)

## 2020-05-29 NOTE — DIETITIAN INITIAL EVALUATION ADULT. - ENERGY NEEDS
ht: 63 inches. pre-pregnancy wt: 186 pounds. pregnancy wt gain: 44 pounds. pre-pregnancy BMI: 32.9 kG/m2. IBW: 115 pounds +/- 10%. %IBW: 162%.

## 2020-05-29 NOTE — PROGRESS NOTE ADULT - SUBJECTIVE AND OBJECTIVE BOX
Postpartum Note,  Section   ATTENDING NOTE - Post-operative day 1    Subjective:  The patient feels well. Ambulating without difficulty  Pt is tolerating regular diet. Pain well controlled.  She denies nausea and vomiting.         ( x  ) Cano still in place    (She reports normal postpartum bleeding    Physical exam:    Vital Signs Last 24 Hrs  T(C): 37 (29 May 2020 09:30), Max: 37.6 (29 May 2020 03:00)  T(F): 98.6 (29 May 2020 09:30), Max: 99.6 (29 May 2020 03:00)  HR: 94 (29 May 2020 09:30) (85 - 122)  BP: 118/73 (29 May 2020 09:30) (106/56 - 188/90)  BP(mean): 80 (29 May 2020 09:00) (74 - 129)  RR: 22 (29 May 2020 09:30) (17 - 39)  SpO2: 96% (29 May 2020 09:30) (95% - 100%)    Gen: NAD  Breast: Soft, nontender, not engorged.  Abdomen: Soft, nontender, no distension , firm uterine fundus at umbilicus.  Incision: Clean, dry, and intact  Pelvic: Normal lochia noted  Ext: No calf tenderness, no hyper reflexia       LABS:                            10.6   12.91 )-----------( 117      ( 29 May 2020 04:20 )             29.8                         11.0   14.34 )-----------( 116      ( 29 May 2020 00:32 )             31.4                         12.1   15.37 )-----------( 117      ( 28 May 2020 20:15 )             34.6                         10.7   16.12 )-----------( 122      ( 28 May 2020 16:22 )             32.1                         11.2   38.36 )-----------( 278      ( 28 May 2020 13:48 )             36.2                         12.8   12.71 )-----------( 201      ( 28 May 2020 04:45 )             39.2     Magnesium, Serum: 2.4 mg/dL ( @ 01:46)  Magnesium, Serum: 1.3 mg/dL ( @ 20:15)    20 @ 01:46      136  |  101  |  5<L>  ----------------------------<  132<H>  3.8   |  21<L>  |  0.43<L>    20 @ 20:15      140  |  102  |  7   ----------------------------<  143<H>  3.5   |  21<L>  |  0.45<L>    20 @ 16:22      140  |  104  |  9   ----------------------------<  209<H>  4.3   |  17<L>  |  0.52    20 @ 13:48      135  |  102  |  12  ----------------------------<  372<H>  3.8   |  <10<LL>  |  0.85        Ca    8.6      29 May 2020 01:46  Ca    10.4      28 May 2020 20:15  Ca    9.4      28 May 2020 16:22  Ca    8.4      28 May 2020 13:48  Phos  3.4       Phos  4.7<H>       Mg     2.4       Mg     1.3<L>         TPro  5.9<L>  /  Alb  3.9  /  TBili  0.8  /  DBili  0.2  /  AST  29  /  ALT  19  /  AlkPhos  64  20 @ 00:34  TPro  6.4  /  Alb  4.4  /  TBili  1.4<H>  /  DBili  x   /  AST  28  /  ALT  21  /  AlkPhos  67  20 @ 20:15  TPro  5.0<L>  /  Alb  3.2<L>  /  TBili  0.8  /  DBili  x   /  AST  21  /  ALT  19  /  AlkPhos  66  20 @ 16:22  TPro  5.6<L>  /  Alb  3.2<L>  /  TBili  0.2  /  DBili  x   /  AST  17  /  ALT  15  /  AlkPhos  139<H>  20 @ 13:48        Allergies    No Known Allergies    Intolerances      MEDICATIONS  (STANDING):  acetaminophen  IVPB .. 1000 milliGRAM(s) IV Intermittent every 6 hours  chlorhexidine 2% Cloths 1 Application(s) Topical <User Schedule>  polyethylene glycol 3350 17 Gram(s) Oral daily  prenatal multivitamin 1 Tablet(s) Oral daily  senna 2 Tablet(s) Oral at bedtime    MEDICATIONS  (PRN):  oxyCODONE    IR 5 milliGRAM(s) Oral every 4 hours PRN Moderate Pain (4 - 6)  oxyCODONE    IR 10 milliGRAM(s) Oral every 6 hours PRN Severe Pain (7 - 10)        Assessment and Plan  POD # 1  s/p  section with C/HYST for uterine rupture . Stable.  Encourage ambulation  Continue with PCEA/PCA  Regular diet as tolerated  Follow up labs  d/c mahad

## 2020-05-29 NOTE — PROGRESS NOTE ADULT - PROBLEM SELECTOR PLAN 1
Neuro: IV pain meds prn.    CV: Hemodynamically stable.  H/H stable.     -Elevated BP upon admission to SICU:  HELLP labs wnl.  BP currently wnl.   Does not met criteria for hypertensive disorder at this time.  Will continue to monitor BP per routine.       -Patient s/p 11uPRBC, 8 FFP, 2 Plt, 1 Cryo, 1 L 5% albumin.    Pulm: Saturating well on room air, encourage oob/amb  GI: Advanced to clear liquid diet this am.  Will continue to advance diet as tolerated.   : UOP adequate overnight.  Will consider d/c tobin later today.  continue to use for hemodynamic monitoring at this time.   Heme: currently on SCD's for DVT ppx.  Would consider adding HSQ vs. Lovenox this AM for DVT ppx given demonstrated hemodynamic stability overnight.   FEN: LR@125.  replete electrolytes prn   ID: Afebrile  Endo: No active issues   Dispo:  Appreciate excellent SICU care     Ebony De PGY-4

## 2020-05-29 NOTE — PROGRESS NOTE ADULT - ASSESSMENT
A/P  34y  s/p  w/ RML c/b cervical laceration with extension into broad ligament and R lateral uterine wall rupture s/p ex-lap, abd hysterectomy, right salpingectomy, right ureterolysis, and cystoscopy. (EBL 3500, s/p 11uPRBCs/8uFFP/2u platelets/1uCryo/1L albumin/7L cystalloid). Pt transferred to SICU intubated postoperatively on  and extubated overnight. Pt currently in SICU extubated and in stable condition.

## 2020-05-29 NOTE — DIETITIAN INITIAL EVALUATION ADULT. - OTHER INFO
Pt is a 34 year old  PPD#1 s/p , antepartum course significant for GDMA2 (Insulin and Metformin). Pt reports hx of GDM in prior pregnancy. She plans on exclusively breastfeeding infant.

## 2020-05-29 NOTE — DIETITIAN INITIAL EVALUATION ADULT. - ADD RECOMMEND
Post-partum GDM diet education reviewed: 1) Increased risk of developing T2DM with GDM and ways to help prevent development. 2) 4-12 week fasting oral glucose tolerance test follow-up as outpatient 3) General healthful diet and physical activity recommendations discussed 4) Pre-conception counseling/planning for future pregnancies, 5) Increased energy needs with breastfeeding. After-delivery GDM education handout provided. Continue prenatal vitamin.

## 2020-05-29 NOTE — OCCUPATIONAL THERAPY INITIAL EVALUATION ADULT - ANTICIPATED DISCHARGE DISPOSITION, OT EVAL
Pt limited mainly by abdominal pain at this time limiting ADL performance, anticipate progression to I as pain subsides.

## 2020-05-29 NOTE — PROGRESS NOTE ADULT - ASSESSMENT
A/P: 34y PPD#1 s/p , POD#1 s/p EUA, Ex-lap, abdominal hysterectomy, R salpingectomy, R ureterolysis, Cystoscopy for cervical laceration, R lateral wall uterine rupture w/ broad ligament extension.  Patient extubated overnight.  Patient is stable and doing well.

## 2020-05-29 NOTE — OCCUPATIONAL THERAPY INITIAL EVALUATION ADULT - LIVES WITH, PROFILE
spouse/children/Pt lives with  and now 3 children in a pvt home, steps to enter and steps inside. Pt has a tub shower, no AE/DME. I in all ADLs PTA.

## 2020-05-29 NOTE — PHYSICAL THERAPY INITIAL EVALUATION ADULT - ADDITIONAL COMMENTS
PTA pt lived in pvt home with  and children (able to assist), 7 steps to enter +HR, flight to bedroom/bathroom, tub shower, fully independent without AD.

## 2020-05-29 NOTE — OCCUPATIONAL THERAPY INITIAL EVALUATION ADULT - PERTINENT HX OF CURRENT PROBLEM, REHAB EVAL
34F hx ectopic pregnancy requiring surgical management (12/2018) now s/p vaginal delivery c/b hemorrhage due to cervical laceration with extension into broad ligament and R lateral uterine wall rupture, pt sustained significant blood loss, with resultant hypoperfusion/lactic acidosis, requiring massive transfusion of blood products and crystalloid. Pt emergently brought to OR, now s/p s/p EUA, Ex-lap, abdominal hysterectomy, R salpingectomy, R ureterolysis, Cystoscopy for cervical laceration

## 2020-05-29 NOTE — PROGRESS NOTE ADULT - ASSESSMENT
SANJUANA Kirby is a 34 year old F with a pmhx of an ectopic pregnancy requiring surgical management (12/2018) who is now s/p ex-lap, TAM, right salpingectomy, ureterolysis with cystoscopy for a spontaneous uterine perforation and post-partum hemorrhage sustained during vaginal delivery. The patient sustained significant blood loss, with resultant hypoperfusion/lactic acidosis, requiring massive transfusion of blood products and crystalloid. The patient was transferred to the SICU post-operatively for hemodynamic monitoring and resuscitation.     PLAN BY SYSTEMS  Neurological:    -- Off all sedation  -- Tylenol and PRN Dilaudid for pain control.       Pulmonary:  -- S/p extubation    Cardiovascular:  -- No interval pressor requirements  -- Hemodynamically stable  -- Lactate downtrending 2.1 (down from 3.7). Adequately resuscitated on critical care U/S    Renal:  -- Cr 0.45, adequate UOP  -- Cano, strict I's and O's.   -- Replete electrolytes PRN  -- Lactic acidosis, resolving.   -- Normalized blood gas.    Hematological: s/p massive transfusion protocol  -- Q4H CBC's and ABG's in early resuscitative period.  -- VTE ppx on hold for now, to be initiated provided stable CBCs    Infectious Disease:  -- Afebrile, WBC elevated.  -- Likely reactive leukocytosis  -- Continue to monitor    GI:  -- Diet: NPO  -- Advance diet as tolerated     Endocrine:  -- No active issues, continue to monitor.     Access:    Right IJ Cordis (5/28)  Left radial A-line (5/28)  Cano catheter (5/28)    FULL CODE, discussed.    SICU, [id number] SANJUANA Kirby is a 34 year old F with a pmhx of an ectopic pregnancy requiring surgical management (12/2018) who is now s/p ex-lap, TAM, right salpingectomy, ureterolysis with cystoscopy for a spontaneous uterine perforation and post-partum hemorrhage sustained during vaginal delivery. The patient sustained significant blood loss, with resultant hypoperfusion/lactic acidosis, requiring massive transfusion of blood products and crystalloid. The patient was transferred to the SICU post-operatively for hemodynamic monitoring and resuscitation.     PLAN BY SYSTEMS  Neurological:    -- Off all sedation  -- Tylenol and PRN Dilaudid for pain control.       Pulmonary:  -- S/p extubation, on non-rebreather  -- OOB, incentive spirometry when patient is amenable.    Cardiovascular:  -- No interval pressor requirements  -- Hemodynamically stable  -- Lactate downtrending 2.1 (down from 3.7). Adequately resuscitated on critical care U/S    Renal:  -- Cr 0.45, adequate UOP  -- Cano, strict I's and O's.   -- Replete electrolytes PRN  -- Lactic acidosis, resolving.   -- Normalized blood gas.    Hematological: s/p massive transfusion protocol  -- Q4H CBC's and ABG's in early resuscitative period.  -- VTE ppx on hold for now, to be initiated provided stable CBCs    Infectious Disease:  -- Afebrile, WBC elevated.  -- Likely reactive leukocytosis  -- Continue to monitor    GI:  -- Diet: NPO  -- Advance diet as tolerated     Endocrine:  -- No active issues, continue to monitor.     Access:    Right IJ Cordis (5/28)  Left radial A-line (5/28)  Cano catheter (5/28)    FULL CODE, discussed.    SICU, [id number] SANJUANA Kirby is a 34 year old F with a pmhx of an ectopic pregnancy requiring surgical management (12/2018) who is now s/p ex-lap, TAM, right salpingectomy, ureterolysis with cystoscopy for a spontaneous uterine perforation and post-partum hemorrhage sustained during vaginal delivery. The patient sustained significant blood loss, with resultant hypoperfusion/lactic acidosis, requiring massive transfusion of blood products and crystalloid. The patient was transferred to the SICU post-operatively for hemodynamic monitoring and resuscitation.     PLAN BY SYSTEMS  Neurological:    -- Off all sedation  -- Tylenol and PRN Oxycodone for pain control.       Pulmonary:  -- S/p extubation, on non-rebreather  -- OOB, incentive spirometry when patient is amenable.    Cardiovascular:  -- No interval pressor requirements  -- Hemodynamically stable  -- Lactate downtrending 1.4 (down from 3.7). Adequately resuscitated on critical care U/S    Renal:  -- Cr 0.45, adequate UOP  -- Cano, strict I's and O's.   -- Replete electrolytes PRN  -- Lactic acidosis, resolving.   -- Normalized blood gas.    Hematological: s/p massive transfusion protocol  -- Q4H CBC's and ABG's in early resuscitative period.  -- VTE ppx on hold for now, to be initiated provided stable CBCs    Infectious Disease:  -- Afebrile, WBC elevated.  -- Likely reactive leukocytosis  -- Continue to monitor    GI:  -- Diet: Clear liquid diet, consistent carbohydrate (Gestational diabetic) + 3 snacks.  -- Advance diet as tolerated   -- Bowel regimen: Senna + Miralax    Endocrine:  -- No active issues, continue to monitor.     Access:    Right IJ Cordis (5/28)  Left radial A-line (5/28)  Cano catheter (5/28)    FULL CODE, discussed.    SICU, [id number]

## 2020-05-29 NOTE — PROGRESS NOTE ADULT - SUBJECTIVE AND OBJECTIVE BOX
NOEMI ELIZONDO Progress Note    PPD#1  POD#1  HD#2    Patient seen and examined at bedside.   Overnight patient was extubated.   No acute events overnight.   Patient this AM reporting overall abdominal soreness.  Pain relieved with analgesia.   Not yet OOB.  Tolerated sips of water.   Not yet passing flatus.  Cano in place and draining clear urine.   Denies CP, SOB, lightheadness, dizziness, N/V, fevers, and chills.    Vital Signs Last 24 Hours  T(C): 37.6 (05-29-20 @ 03:00), Max: 37.6 (05-29-20 @ 03:00)  HR: 92 (05-29-20 @ 06:00) (85 - 122)  BP: 188/90 (05-28-20 @ 19:45) (120/76 - 188/90)  RR: 24 (05-29-20 @ 06:00) (17 - 39)  SpO2: 95% (05-29-20 @ 06:00) (95% - 100%)    Physical Exam:  General: NAD  CV: RR, S1, S2, no M/R/G  Lungs: CTA b/l, good air flow b/l   Abdomen: Soft, mildly-tender to palpation diffusely, softly distended, normoactive bowel sounds  Incision: midline vertical CDI  Vaginal: No bleeding noted.   Episiotomy site inspected.  No erythema, edema, induration.   c/d/i   : no bleeding on pad  Ext: No pain or swelling.   SCD's in place    Labs:                        10.6   12.91 )-----------( 117      ( 29 May 2020 04:20 )             29.8   baso x      eos x      imm gran x      lymph x      mono x      poly x                            11.0   14.34 )-----------( 116      ( 29 May 2020 00:32 )             31.4   baso x      eos x      imm gran x      lymph x      mono x      poly x                            12.1   15.37 )-----------( 117      ( 28 May 2020 20:15 )             34.6   baso 0.3    eos 1.1    imm gran 1.0    lymph 6.6    mono 5.6    poly 85.4                         10.7   16.12 )-----------( 122      ( 28 May 2020 16:22 )             32.1   baso x      eos x      imm gran x      lymph x      mono x      poly x                            11.2   38.36 )-----------( 278      ( 28 May 2020 13:48 )             36.2   baso x      eos x      imm gran x      lymph x      mono x      poly x                            12.8   12.71 )-----------( 201      ( 28 May 2020 04:45 )             39.2   baso 0.3    eos 1.4    imm gran 0.4    lymph 23.8   mono 7.6    poly 66.5       MEDICATIONS  (STANDING):  acetaminophen  IVPB .. 1000 milliGRAM(s) IV Intermittent every 6 hours  chlorhexidine 2% Cloths 1 Application(s) Topical <User Schedule>  polyethylene glycol 3350 17 Gram(s) Oral daily  prenatal multivitamin 1 Tablet(s) Oral daily  senna 2 Tablet(s) Oral at bedtime    MEDICATIONS  (PRN):  oxyCODONE    IR 5 milliGRAM(s) Oral every 4 hours PRN Moderate Pain (4 - 6)  oxyCODONE    IR 10 milliGRAM(s) Oral every 6 hours PRN Severe Pain (7 - 10)

## 2020-05-29 NOTE — PHYSICAL THERAPY INITIAL EVALUATION ADULT - PERTINENT HX OF CURRENT PROBLEM, REHAB EVAL
Pt is a 34 y.o female hx  ectopic pregnancy requiring surgical management (12/2018) now s/p vaginal delivery c/b hemorrhage due to cervical laceration with extension into broad ligament and R lateral uterine wall rupture, pt sustained significant blood loss, with resultant hypoperfusion/lactic acidosis, requiring massive transfusion of blood products and crystalloid. Pt emergently brought to OR, now s/p sx as noted above.

## 2020-05-29 NOTE — PHYSICAL THERAPY INITIAL EVALUATION ADULT - DID THE PATIENT HAVE SURGERY?
s/p EUA, Ex-lap, abdominal hysterectomy, R salpingectomy, R ureterolysis, Cystoscopy for cervical laceration/yes

## 2020-05-29 NOTE — PROGRESS NOTE ADULT - SUBJECTIVE AND OBJECTIVE BOX
SURGICAL INTENSIVE CARE UNIT DAILY PROGRESS NOTE    24-HOUR EVENTS:   -- S/p extubation to non-rebreather mask  -- Patient adequately clearing her lactate    NEURO  Exam:   Meds: acetaminophen  IVPB .. 1000 milliGRAM(s) IV Intermittent every 6 hours  HYDROmorphone  Injectable 0.5 milliGRAM(s) IV Push every 3 hours PRN Severe Pain (7 - 10)  [x] Adequacy of sedation and pain control has been assessed and adjusted      RESPIRATORY  RR: 20 (05-29-20 @ 00:30) (17 - 39)  SpO2: 100% (05-29-20 @ 00:30) (99% - 100%)  Wt(kg): --  Exam: unlabored, clear to auscultation bilaterally  Mechanical Ventilation: Mode: CPAP with PS, RR (patient): 17, FiO2: 30, PEEP: 5, PS: 5, MAP: 8, PIP: 12  ABG - ( 29 May 2020 00:26 )  pH: 7.48  /  pCO2: 33    /  pO2: 122   / HCO3: 25    / Base Excess: 1.9   /  SaO2: 99      Lactate: x                [ ] Extubation Readiness Assessed  Meds:       CARDIOVASCULAR  HR: 100 (05-29-20 @ 00:30) (85 - 122)  BP: 188/90 (05-28-20 @ 19:45) (120/76 - 188/90)  BP(mean): 129 (05-28-20 @ 19:45) (129 - 129)  ABP: 150/67 (05-29-20 @ 00:30) (0/0 - 208/88)  ABP(mean): 91 (05-29-20 @ 00:30) (0 - 124)  Wt(kg): --  CVP(cm H2O): --    Lactate, Blood: 7.0 mmol/L (05-28 @ 16:22)    Exam:  Cardiac Rhythm:  Perfusion     [ ]Adequate   [ ]Inadequate  Mentation   [ ]Normal       [ ]Reduced  Extremities  [ ]Warm         [ ]Cool  Volume Status [ ]Hypervolemic [ ]Euvolemic [ ]Hypovolemic  Meds:       GI/NUTRITION  Exam:  Diet:  Meds:     GENITOURINARY  I&O's Detail    05-28 @ 07:01  -  05-29 @ 01:02  --------------------------------------------------------  IN:    IV PiggyBack: 150 mL    lactated ringers.: 625 mL    propofol Infusion: 88.1 mL    Solution: 200 mL  Total IN: 1063.1 mL    OUT:    Estimated Blood Loss: 3500 mL    Indwelling Catheter - Urethral: 2035 mL  Total OUT: 5535 mL    Total NET: -4471.9 mL        Weight (kg): 104.8 (05-28 @ 15:21)  05-28    140  |  102  |  7   ----------------------------<  143<H>  3.5   |  21<L>  |  0.45<L>    Ca    10.4      28 May 2020 20:15  Phos  4.7     05-28  Mg     1.3     05-28    TPro  6.4  /  Alb  4.4  /  TBili  1.4<H>  /  DBili  x   /  AST  28  /  ALT  21  /  AlkPhos  67  05-28    [ ] Cano catheter, indication: N/A  Meds: lactated ringers. 1000 milliLiter(s) IV Continuous <Continuous>        HEMATOLOGIC  Meds:   [x] VTE Prophylaxis                        11.0   14.34 )-----------( 116      ( 29 May 2020 00:32 )             31.4     PT/INR - ( 29 May 2020 00:32 )   PT: 10.6 sec;   INR: 0.92 ratio         PTT - ( 29 May 2020 00:32 )  PTT:23.1 sec  Transfusion     [ ] PRBC   [ ] Platelets   [ ] FFP   [ ] Cryoprecipitate      INFECTIOUS DISEASES  T(C): 37 (05-28-20 @ 23:00), Max: 37 (05-28-20 @ 15:16)  Wt(kg): --  WBC Count: 14.34 K/uL (05-29 @ 00:32)  WBC Count: 15.37 K/uL (05-28 @ 20:15)  WBC Count: 16.12 K/uL (05-28 @ 16:22)  WBC Count: 38.36 K/uL (05-28 @ 13:48)  WBC Count: 12.71 K/uL (05-28 @ 04:45)    Recent Cultures:    Meds:       ENDOCRINE  Capillary Blood Glucose    Meds:       ACCESS DEVICES:  [ ] Peripheral IV  [ ] Central Venous Line	[ ] R	[ ] L	[ ] IJ	[ ] Fem	[ ] SC	Placed:   [ ] Arterial Line		[ ] R	[ ] L	[ ] Fem	[ ] Rad	[ ] Ax	Placed:   [ ] PICC:					[ ] Mediport  [ ] Urinary Catheter, Date Placed:   [ ] Necessity of urinary, arterial, and venous catheters discussed    OTHER MEDICATIONS:  chlorhexidine 2% Cloths 1 Application(s) Topical <User Schedule>      CODE STATUS:     IMAGING: SURGICAL INTENSIVE CARE UNIT DAILY PROGRESS NOTE    24-HOUR EVENTS:   -- S/p extubation to non-rebreather mask  -- Patient adequately clearing her lactate    NEURO  Exam: Awake, alert and oriented.  Meds: acetaminophen  IVPB .. 1000 milliGRAM(s) IV Intermittent every 6 hours  HYDROmorphone  Injectable 0.5 milliGRAM(s) IV Push every 3 hours PRN Severe Pain (7 - 10)  [x] Adequacy of sedation and pain control has been assessed and adjusted      RESPIRATORY  RR: 20 (05-29-20 @ 00:30) (17 - 39)  SpO2: 100% (05-29-20 @ 00:30) (99% - 100%)  Wt(kg): --  Exam: unlabored, clear to auscultation bilaterally  Mechanical Ventilation: Mode: CPAP with PS, RR (patient): 17, FiO2: 30, PEEP: 5, PS: 5, MAP: 8, PIP: 12  ABG - ( 29 May 2020 00:26 )  pH: 7.48  /  pCO2: 33    /  pO2: 122   / HCO3: 25    / Base Excess: 1.9   /  SaO2: 99      Lactate: x          CARDIOVASCULAR  HR: 100 (05-29-20 @ 00:30) (85 - 122)  BP: 188/90 (05-28-20 @ 19:45) (120/76 - 188/90)  BP(mean): 129 (05-28-20 @ 19:45) (129 - 129)  ABP: 150/67 (05-29-20 @ 00:30) (0/0 - 208/88)  ABP(mean): 91 (05-29-20 @ 00:30) (0 - 124)  Wt(kg): --  CVP(cm H2O): --  Lactate, Blood: 2.1 mmol/L (05-28 @ 16:22)  Exam: S1 S2 no MGR  Cardiac Rhythm: Normal sinus rhythm  Perfusion     [ ]Adequate   [x]Inadequate  Mentation   [ ]Normal       [x]Reduced  Extremities  [x]Warm         [ ]Cool  Volume Status [x]Hypervolemic [ ]Euvolemic [ ]Hypovolemic      GI/NUTRITION  Exam: Soft, non-distended, laparotomy incision clean, dry and intact.  Diet: NPO    GENITOURINARY  I&O's Detail    05-28 @ 07:01  -  05-29 @ 01:02  --------------------------------------------------------  IN:    IV PiggyBack: 150 mL    lactated ringers.: 625 mL    propofol Infusion: 88.1 mL    Solution: 200 mL  Total IN: 1063.1 mL    OUT:    Estimated Blood Loss: 3500 mL    Indwelling Catheter - Urethral: 2035 mL  Total OUT: 5535 mL    Total NET: -4471.9 mL        Weight (kg): 104.8 (05-28 @ 15:21)  05-28    140  |  102  |  7   ----------------------------<  143<H>  3.5   |  21<L>  |  0.45<L>    Ca    10.4      28 May 2020 20:15  Phos  4.7     05-28  Mg     1.3     05-28    TPro  6.4  /  Alb  4.4  /  TBili  1.4<H>  /  DBili  x   /  AST  28  /  ALT  21  /  AlkPhos  67  05-28    [ ] Cano catheter, indication: N/A  Meds: lactated ringers. 1000 milliLiter(s) IV Continuous <Continuous>        HEMATOLOGIC  Meds:   [x] VTE Prophylaxis                        11.0   14.34 )-----------( 116      ( 29 May 2020 00:32 )             31.4     PT/INR - ( 29 May 2020 00:32 )   PT: 10.6 sec;   INR: 0.92 ratio         PTT - ( 29 May 2020 00:32 )  PTT:23.1 sec  Transfusion     [ ] PRBC   [ ] Platelets   [ ] FFP   [ ] Cryoprecipitate      INFECTIOUS DISEASES  T(C): 37 (05-28-20 @ 23:00), Max: 37 (05-28-20 @ 15:16)  Wt(kg): --  WBC Count: 14.34 K/uL (05-29 @ 00:32)  WBC Count: 15.37 K/uL (05-28 @ 20:15)  WBC Count: 16.12 K/uL (05-28 @ 16:22)  WBC Count: 38.36 K/uL (05-28 @ 13:48)  WBC Count: 12.71 K/uL (05-28 @ 04:45)    Recent Cultures:    Meds:       ENDOCRINE  Capillary Blood Glucose    Meds:       OTHER MEDICATIONS:  chlorhexidine 2% Cloths 1 Application(s) Topical <User Schedule>      CODE STATUS: Full code SURGICAL INTENSIVE CARE UNIT DAILY PROGRESS NOTE    24-HOUR EVENTS:   -- S/p extubation to non-rebreather mask  -- Patient adequately clearing her lactate  -- Diet advanced to clear liquid diet, advancing slowly as tolerated.    NEURO  Exam: Awake, alert and oriented.  Meds: acetaminophen  IVPB .. 1000 milliGRAM(s) IV Intermittent every 6 hours  HYDROmorphone  Injectable 0.5 milliGRAM(s) IV Push every 3 hours PRN Severe Pain (7 - 10)  [x] Adequacy of sedation and pain control has been assessed and adjusted      RESPIRATORY  RR: 20 (05-29-20 @ 00:30) (17 - 39)  SpO2: 100% (05-29-20 @ 00:30) (99% - 100%)  Wt(kg): --  Exam: unlabored, clear to auscultation bilaterally  Mechanical Ventilation: Mode: CPAP with PS, RR (patient): 17, FiO2: 30, PEEP: 5, PS: 5, MAP: 8, PIP: 12  ABG - ( 29 May 2020 00:26 )  pH: 7.48  /  pCO2: 33    /  pO2: 122   / HCO3: 25    / Base Excess: 1.9   /  SaO2: 99      Lactate: x          CARDIOVASCULAR  HR: 100 (05-29-20 @ 00:30) (85 - 122)  BP: 188/90 (05-28-20 @ 19:45) (120/76 - 188/90)  BP(mean): 129 (05-28-20 @ 19:45) (129 - 129)  ABP: 150/67 (05-29-20 @ 00:30) (0/0 - 208/88)  ABP(mean): 91 (05-29-20 @ 00:30) (0 - 124)  Wt(kg): --  CVP(cm H2O): --  Lactate, Blood: 2.1 mmol/L (05-28 @ 16:22)  Exam: S1 S2 no MGR  Cardiac Rhythm: Normal sinus rhythm  Perfusion     [ ]Adequate   [x]Inadequate  Mentation   [ ]Normal       [x]Reduced  Extremities  [x]Warm         [ ]Cool  Volume Status [x]Hypervolemic [ ]Euvolemic [ ]Hypovolemic      GI/NUTRITION  Exam: Soft, non-distended, laparotomy incision clean, dry and intact.  Diet: NPO    GENITOURINARY  I&O's Detail    05-28 @ 07:01  -  05-29 @ 01:02  --------------------------------------------------------  IN:    IV PiggyBack: 150 mL    lactated ringers.: 625 mL    propofol Infusion: 88.1 mL    Solution: 200 mL  Total IN: 1063.1 mL    OUT:    Estimated Blood Loss: 3500 mL    Indwelling Catheter - Urethral: 2035 mL  Total OUT: 5535 mL    Total NET: -4471.9 mL        Weight (kg): 104.8 (05-28 @ 15:21)  05-28    140  |  102  |  7   ----------------------------<  143<H>  3.5   |  21<L>  |  0.45<L>    Ca    10.4      28 May 2020 20:15  Phos  4.7     05-28  Mg     1.3     05-28    TPro  6.4  /  Alb  4.4  /  TBili  1.4<H>  /  DBili  x   /  AST  28  /  ALT  21  /  AlkPhos  67  05-28    [ ] Cano catheter, indication: N/A  Meds: lactated ringers. 1000 milliLiter(s) IV Continuous <Continuous>        HEMATOLOGIC  Meds:   [x] VTE Prophylaxis                        11.0   14.34 )-----------( 116      ( 29 May 2020 00:32 )             31.4     PT/INR - ( 29 May 2020 00:32 )   PT: 10.6 sec;   INR: 0.92 ratio         PTT - ( 29 May 2020 00:32 )  PTT:23.1 sec  Transfusion     [ ] PRBC   [ ] Platelets   [ ] FFP   [ ] Cryoprecipitate      INFECTIOUS DISEASES  T(C): 37 (05-28-20 @ 23:00), Max: 37 (05-28-20 @ 15:16)  Wt(kg): --  WBC Count: 14.34 K/uL (05-29 @ 00:32)  WBC Count: 15.37 K/uL (05-28 @ 20:15)  WBC Count: 16.12 K/uL (05-28 @ 16:22)  WBC Count: 38.36 K/uL (05-28 @ 13:48)  WBC Count: 12.71 K/uL (05-28 @ 04:45)    Recent Cultures:    Meds:       ENDOCRINE  Capillary Blood Glucose    Meds:       OTHER MEDICATIONS:  chlorhexidine 2% Cloths 1 Application(s) Topical <User Schedule>      CODE STATUS: Full code

## 2020-05-29 NOTE — PROGRESS NOTE ADULT - PROBLEM SELECTOR PLAN 1
Neuro: Pain well controlled with Dilaudid and IV Tylenol overnight; status post propofol for sedation. Transitioned to IV tylenol and oxycodone this AM.   CV: Hemodynamically stable; Hct 34.6 ->31.4. No pressors required in postoperative period. Patient hypertensive postoperatively however with adequate sedation and pain control BPs improved to non-severe range and no antihypertensives were required. Patient without signs/symptoms of preeclampsia. Will continue to monitor BPs closely.   Pulm: Extubated overnight, currently O2 sat WNL on RA. encourage incentive spirometry use. encourage ambulation.  GI: NPO, transitioned to CLD this AM   : UOP adequate overnight; Cano in place  Heme: DVT ppx:  SCDs while in bed, consider HSQ for DVT ppx  ID: Afebrile, No signs of infection  Dispo: appreciate excellent SICU care       Petr PGY3

## 2020-05-29 NOTE — PHYSICAL THERAPY INITIAL EVALUATION ADULT - CRITERIA FOR SKILLED THERAPEUTIC INTERVENTIONS
predicted duration of therapy intervention/impairments found/functional limitations in following categories/therapy frequency/anticipated discharge recommendation/rehab potential/risk reduction/prevention

## 2020-05-30 LAB
ALBUMIN SERPL ELPH-MCNC: 3.5 G/DL — SIGNIFICANT CHANGE UP (ref 3.3–5)
ALP SERPL-CCNC: 75 U/L — SIGNIFICANT CHANGE UP (ref 40–120)
ALT FLD-CCNC: 15 U/L — SIGNIFICANT CHANGE UP (ref 10–45)
ANION GAP SERPL CALC-SCNC: 15 MMOL/L — SIGNIFICANT CHANGE UP (ref 5–17)
APTT BLD: 25 SEC — LOW (ref 27.5–36.3)
AST SERPL-CCNC: 22 U/L — SIGNIFICANT CHANGE UP (ref 10–40)
BASOPHILS # BLD AUTO: 0.04 K/UL — SIGNIFICANT CHANGE UP (ref 0–0.2)
BASOPHILS NFR BLD AUTO: 0.2 % — SIGNIFICANT CHANGE UP (ref 0–2)
BILIRUB DIRECT SERPL-MCNC: <0.1 MG/DL — SIGNIFICANT CHANGE UP (ref 0–0.2)
BILIRUB INDIRECT FLD-MCNC: >0.3 MG/DL — SIGNIFICANT CHANGE UP (ref 0.2–1)
BILIRUB SERPL-MCNC: 0.4 MG/DL — SIGNIFICANT CHANGE UP (ref 0.2–1.2)
BUN SERPL-MCNC: 5 MG/DL — LOW (ref 7–23)
CALCIUM SERPL-MCNC: 8.7 MG/DL — SIGNIFICANT CHANGE UP (ref 8.4–10.5)
CHLORIDE SERPL-SCNC: 99 MMOL/L — SIGNIFICANT CHANGE UP (ref 96–108)
CO2 SERPL-SCNC: 20 MMOL/L — LOW (ref 22–31)
CREAT SERPL-MCNC: 0.49 MG/DL — LOW (ref 0.5–1.3)
EOSINOPHIL # BLD AUTO: 0.05 K/UL — SIGNIFICANT CHANGE UP (ref 0–0.5)
EOSINOPHIL NFR BLD AUTO: 0.3 % — SIGNIFICANT CHANGE UP (ref 0–6)
GLUCOSE SERPL-MCNC: 162 MG/DL — HIGH (ref 70–99)
HCT VFR BLD CALC: 33 % — LOW (ref 34.5–45)
HGB BLD-MCNC: 10.6 G/DL — LOW (ref 11.5–15.5)
IMM GRANULOCYTES NFR BLD AUTO: 0.7 % — SIGNIFICANT CHANGE UP (ref 0–1.5)
INR BLD: 0.97 RATIO — SIGNIFICANT CHANGE UP (ref 0.88–1.16)
KLEIHAUER-BETKE CALCULATION: 0 % — SIGNIFICANT CHANGE UP (ref 0–0.3)
LYMPHOCYTES # BLD AUTO: 1.6 K/UL — SIGNIFICANT CHANGE UP (ref 1–3.3)
LYMPHOCYTES # BLD AUTO: 9.4 % — LOW (ref 13–44)
MCHC RBC-ENTMCNC: 27.8 PG — SIGNIFICANT CHANGE UP (ref 27–34)
MCHC RBC-ENTMCNC: 32.1 GM/DL — SIGNIFICANT CHANGE UP (ref 32–36)
MCV RBC AUTO: 86.6 FL — SIGNIFICANT CHANGE UP (ref 80–100)
MONOCYTES # BLD AUTO: 0.82 K/UL — SIGNIFICANT CHANGE UP (ref 0–0.9)
MONOCYTES NFR BLD AUTO: 4.8 % — SIGNIFICANT CHANGE UP (ref 2–14)
NEUTROPHILS # BLD AUTO: 14.42 K/UL — HIGH (ref 1.8–7.4)
NEUTROPHILS NFR BLD AUTO: 84.6 % — HIGH (ref 43–77)
NRBC # BLD: 0 /100 WBCS — SIGNIFICANT CHANGE UP (ref 0–0)
PLATELET # BLD AUTO: 128 K/UL — LOW (ref 150–400)
POTASSIUM SERPL-MCNC: 4 MMOL/L — SIGNIFICANT CHANGE UP (ref 3.5–5.3)
POTASSIUM SERPL-SCNC: 4 MMOL/L — SIGNIFICANT CHANGE UP (ref 3.5–5.3)
PROT SERPL-MCNC: 6.1 G/DL — SIGNIFICANT CHANGE UP (ref 6–8.3)
PROTHROM AB SERPL-ACNC: 11.2 SEC — SIGNIFICANT CHANGE UP (ref 10–12.9)
RBC # BLD: 3.81 M/UL — SIGNIFICANT CHANGE UP (ref 3.8–5.2)
RBC # FLD: 16.3 % — HIGH (ref 10.3–14.5)
SODIUM SERPL-SCNC: 134 MMOL/L — LOW (ref 135–145)
WBC # BLD: 17.05 K/UL — HIGH (ref 3.8–10.5)
WBC # FLD AUTO: 17.05 K/UL — HIGH (ref 3.8–10.5)

## 2020-05-30 RX ORDER — FAMOTIDINE 10 MG/ML
20 INJECTION INTRAVENOUS DAILY
Refills: 0 | Status: DISCONTINUED | OUTPATIENT
Start: 2020-05-30 | End: 2020-05-31

## 2020-05-30 RX ORDER — SIMETHICONE 80 MG/1
80 TABLET, CHEWABLE ORAL ONCE
Refills: 0 | Status: COMPLETED | OUTPATIENT
Start: 2020-05-30 | End: 2020-05-30

## 2020-05-30 RX ORDER — TETANUS AND DIPHTHERIA TOXOIDS ADSORBED 2; 2 [LF]/.5ML; [LF]/.5ML
0.5 INJECTION INTRAMUSCULAR ONCE
Refills: 0 | Status: DISCONTINUED | OUTPATIENT
Start: 2020-05-30 | End: 2020-06-03

## 2020-05-30 RX ORDER — ACETAMINOPHEN 500 MG
975 TABLET ORAL EVERY 6 HOURS
Refills: 0 | Status: DISCONTINUED | OUTPATIENT
Start: 2020-05-30 | End: 2020-05-31

## 2020-05-30 RX ADMIN — OXYCODONE HYDROCHLORIDE 5 MILLIGRAM(S): 5 TABLET ORAL at 06:05

## 2020-05-30 RX ADMIN — SENNA PLUS 2 TABLET(S): 8.6 TABLET ORAL at 23:29

## 2020-05-30 RX ADMIN — POLYETHYLENE GLYCOL 3350 17 GRAM(S): 17 POWDER, FOR SOLUTION ORAL at 13:24

## 2020-05-30 RX ADMIN — HEPARIN SODIUM 10000 UNIT(S): 5000 INJECTION INTRAVENOUS; SUBCUTANEOUS at 17:41

## 2020-05-30 RX ADMIN — Medication 975 MILLIGRAM(S): at 19:58

## 2020-05-30 RX ADMIN — Medication 400 MILLIGRAM(S): at 02:18

## 2020-05-30 RX ADMIN — FAMOTIDINE 20 MILLIGRAM(S): 10 INJECTION INTRAVENOUS at 16:48

## 2020-05-30 RX ADMIN — OXYCODONE HYDROCHLORIDE 5 MILLIGRAM(S): 5 TABLET ORAL at 15:33

## 2020-05-30 RX ADMIN — SIMETHICONE 80 MILLIGRAM(S): 80 TABLET, CHEWABLE ORAL at 19:59

## 2020-05-30 RX ADMIN — OXYCODONE HYDROCHLORIDE 5 MILLIGRAM(S): 5 TABLET ORAL at 10:41

## 2020-05-30 RX ADMIN — Medication 1 TABLET(S): at 13:24

## 2020-05-30 RX ADMIN — HEPARIN SODIUM 10000 UNIT(S): 5000 INJECTION INTRAVENOUS; SUBCUTANEOUS at 06:05

## 2020-05-30 NOTE — PROGRESS NOTE ADULT - SUBJECTIVE AND OBJECTIVE BOX
Postpartum Note,  Section   ATTENDING NOTE Post-operative day 2    Subjective:  The patient feels well.  She is ambulating.   She is tolerating regular diet.  She denies nausea and vomiting.  She is voiding.  Her pain is controlled.   No flatus    Physical exam:    Vital Signs Last 24 Hrs  T(C): 36.7 (30 May 2020 05:22), Max: 37.5 (29 May 2020 12:45)  T(F): 98 (30 May 2020 05:22), Max: 99.5 (29 May 2020 12:45)  HR: 105 (30 May 2020 05:22) (88 - 110)  BP: 118/77 (30 May 2020 05:22) (106/56 - 133/70)  BP(mean): 80 (29 May 2020 09:00) (74 - 80)  RR: 18 (30 May 2020 05:22) (18 - 27)  SpO2: 97% (30 May 2020 05:22) (95% - 98%)    Gen: NAD  Breast: Soft, nontender, not engorged.  Abdomen: Soft, nontender, no distension ,  Incision: Clean, dry, and intact    Ext: No calf tenderness    LABS:                        11.8   16.34 )-----------( 129      ( 29 May 2020 16:50 )             35.4                         10.6   12.91 )-----------( 117      ( 29 May 2020 04:20 )             29.8                         11.0   14.34 )-----------( 116      ( 29 May 2020 00:32 )             31.4                         12.1   15.37 )-----------( 117      ( 28 May 2020 20:15 )             34.6                         10.7   16.12 )-----------( 122      ( 28 May 2020 16:22 )             32.1                         11.2   38.36 )-----------( 278      ( 28 May 2020 13:48 )             36.2       20 @ 16:50      135  |  101  |  5<L>  ----------------------------<  135<H>  3.7   |  22  |  0.42<L>    05-29-20 @ 01:46      136  |  101  |  5<L>  ----------------------------<  132<H>  3.8   |  21<L>  |  0.43<L>    20 @ 20:15      140  |  102  |  7   ----------------------------<  143<H>  3.5   |  21<L>  |  0.45<L>    20 @ 16:22      140  |  104  |  9   ----------------------------<  209<H>  4.3   |  17<L>  |  0.52    20 @ 13:48      135  |  102  |  12  ----------------------------<  372<H>  3.8   |  <10<LL>  |  0.85        Ca    8.9      29 May 2020 16:50  Ca    8.6      29 May 2020 01:46  Ca    10.4      28 May 2020 20:15  Ca    9.4      28 May 2020 16:22  Ca    8.4      28 May 2020 13:48  Phos  3.4     -  Phos  4.7<H>       Mg     2.4     05-  Mg     1.3<L>         TPro  5.9<L>  /  Alb  3.5  /  TBili  0.6  /  DBili  x   /  AST  28  /  ALT  19  /  AlkPhos  69  20 @ 16:50  TPro  5.9<L>  /  Alb  3.9  /  TBili  0.8  /  DBili  0.2  /  AST  29  /  ALT  19  /  AlkPhos  64  20 @ 00:34  TPro  6.4  /  Alb  4.4  /  TBili  1.4<H>  /  DBili  x   /  AST  28  /  ALT  21  /  AlkPhos  67  20 @ 20:15  TPro  5.0<L>  /  Alb  3.2<L>  /  TBili  0.8  /  DBili  x   /  AST  21  /  ALT  19  /  AlkPhos  66  20 @ 16:22  TPro  5.6<L>  /  Alb  3.2<L>  /  TBili  0.2  /  DBili  x   /  AST  17  /  ALT  15  /  AlkPhos  139<H>  20 @ 13:48        Allergies    No Known Allergies    Intolerances      MEDICATIONS  (STANDING):  chlorhexidine 2% Cloths 1 Application(s) Topical <User Schedule>  heparin   Injectable 90087 Unit(s) SubCutaneous every 12 hours  polyethylene glycol 3350 17 Gram(s) Oral daily  prenatal multivitamin 1 Tablet(s) Oral daily  senna 2 Tablet(s) Oral at bedtime    MEDICATIONS  (PRN):  oxyCODONE    IR 5 milliGRAM(s) Oral every 4 hours PRN Moderate Pain (4 - 6)  oxyCODONE    IR 10 milliGRAM(s) Oral every 6 hours PRN Severe Pain (7 - 10)        Assessment and Plan  POD # 2  s/p  section with chyst . Stable.  Encourage ambulation  Analgesia prn  Regular diet as tolerated

## 2020-05-30 NOTE — CHART NOTE - NSCHARTNOTEFT_GEN_A_CORE
Seen in f/u. Labs and flow reviewed. D/W OB team yesterday and today.   Gyn Onc involvement, surgery and findings d/w pt today. All Q/A.  Plan per OB team.

## 2020-05-30 NOTE — PROGRESS NOTE ADULT - ASSESSMENT
A/P: 34y PPD#2/POD#2 s/p , POD#1 s/p EUA, Ex-lap, abdominal hysterectomy, R salpingectomy, R ureterolysis, Cystoscopy for cervical laceration, R lateral wall uterine rupture w/ broad ligament extension. Patient is stable and doing well postoperatively

## 2020-05-30 NOTE — PROGRESS NOTE ADULT - PROBLEM SELECTOR PLAN 1
Neuro: C/w PO pain meds prn.  CV: Hemodynamically stable.  H/H stable.     -Elevated BP upon admission to SICU:  HELLP labs wnl.  BP currently wnl.   Does not met criteria for hypertensive disorder at this time.  Will continue to monitor BP per routine.       -Patient s/p 11uPRBC, 8 FFP, 2 Plt, 1 Cryo, 1 L 5% albumin.       -f/u AM labs  Pulm: Saturating well on room air, encourage oob/amb  GI: Tolerating regular diet; not yet passing flatus, c/w simethicone and senna prn  : Voiding spontaneously   Heme: HSQ, SCDs, ambulation for DVT ppx.   FEN: LR@125.  replete electrolytes prn   ID: Afebrile  Endo: No active issues   Dispo:  C/w postoperative care    Kbeetham PGY3

## 2020-05-30 NOTE — PROGRESS NOTE ADULT - SUBJECTIVE AND OBJECTIVE BOX
POD#2   HD#3  Patient seen and examined at bedside while pumping, no acute overnight events. No acute complaints, pain well controlled.  Patient is ambulating, voiding spontaneously, and tolerating regular diet.  She is not yet passing flatus.  Denies CP, SOB, nausea, vomiting, fevers, and chills.    Vital Signs Last 24 Hours  T(C): 36.7 (05-30-20 @ 05:22), Max: 37.5 (05-29-20 @ 12:45)  HR: 105 (05-30-20 @ 05:22) (88 - 110)  BP: 118/77 (05-30-20 @ 05:22) (106/56 - 133/70)  RR: 18 (05-30-20 @ 05:22) (18 - 27)  SpO2: 97% (05-30-20 @ 05:22) (95% - 98%)    I&O's Summary    28 May 2020 07:01  -  29 May 2020 07:00  --------------------------------------------------------  IN: 1773.1 mL / OUT: 6115 mL / NET: -4341.9 mL    29 May 2020 07:01  -  30 May 2020 05:44  --------------------------------------------------------  IN: 400 mL / OUT: 1650 mL / NET: -1250 mL        Physical Exam:  General: NAD  CV: NR, RR, S1, S2, no M/R/G  Lungs: CTA-B  Abdomen: Soft, appropriately tender, softly distended, hypoactive BS  Incision: Midline vertical incision C/D/I  Ext: Negative Carmen's sign bilaterally     Labs:                        11.8   16.34 )-----------( 129      ( 29 May 2020 16:50 )             35.4   baso 0.3    eos 0.2    imm gran 0.6    lymph 14.9   mono 6.2    poly 77.8                         10.6   12.91 )-----------( 117      ( 29 May 2020 04:20 )             29.8   baso x      eos x      imm gran x      lymph x      mono x      poly x                            11.0   14.34 )-----------( 116      ( 29 May 2020 00:32 )             31.4   baso x      eos x      imm gran x      lymph x      mono x      poly x                            12.1   15.37 )-----------( 117      ( 28 May 2020 20:15 )             34.6   baso 0.3    eos 1.1    imm gran 1.0    lymph 6.6    mono 5.6    poly 85.4                         10.7   16.12 )-----------( 122      ( 28 May 2020 16:22 )             32.1   baso x      eos x      imm gran x      lymph x      mono x      poly x                            11.2   38.36 )-----------( 278      ( 28 May 2020 13:48 )             36.2   baso x      eos x      imm gran x      lymph x      mono x      poly x                            12.8   12.71 )-----------( 201      ( 28 May 2020 04:45 )             39.2   baso 0.3    eos 1.4    imm gran 0.4    lymph 23.8   mono 7.6    poly 66.5     05-29    135  |  101  |  5<L>  ----------------------------<  135<H>  3.7   |  22  |  0.42<L>    Ca    8.9      29 May 2020 16:50  Phos  3.4     05-29  Mg     2.4     05-29    TPro  5.9<L>  /  Alb  3.5  /  TBili  0.6  /  DBili  x   /  AST  28  /  ALT  19  /  AlkPhos  69  05-29    PT/INR - ( 29 May 2020 16:50 )   PT: 11.1 sec;   INR: 0.97 ratio         PTT - ( 29 May 2020 16:50 )  PTT:20.0 sec      Blood Type: B Negative      MEDICATIONS  (STANDING):  chlorhexidine 2% Cloths 1 Application(s) Topical <User Schedule>  heparin   Injectable 22635 Unit(s) SubCutaneous every 12 hours  polyethylene glycol 3350 17 Gram(s) Oral daily  prenatal multivitamin 1 Tablet(s) Oral daily  senna 2 Tablet(s) Oral at bedtime    MEDICATIONS  (PRN):  oxyCODONE    IR 5 milliGRAM(s) Oral every 4 hours PRN Moderate Pain (4 - 6)  oxyCODONE    IR 10 milliGRAM(s) Oral every 6 hours PRN Severe Pain (7 - 10)

## 2020-05-31 LAB
ALBUMIN SERPL ELPH-MCNC: 3.3 G/DL — SIGNIFICANT CHANGE UP (ref 3.3–5)
ALP SERPL-CCNC: 80 U/L — SIGNIFICANT CHANGE UP (ref 40–120)
ALT FLD-CCNC: 17 U/L — SIGNIFICANT CHANGE UP (ref 10–45)
ANION GAP SERPL CALC-SCNC: 13 MMOL/L — SIGNIFICANT CHANGE UP (ref 5–17)
APTT BLD: 23.8 SEC — LOW (ref 27.5–36.3)
AST SERPL-CCNC: 31 U/L — SIGNIFICANT CHANGE UP (ref 10–40)
BASOPHILS # BLD AUTO: 0.03 K/UL — SIGNIFICANT CHANGE UP (ref 0–0.2)
BASOPHILS NFR BLD AUTO: 0.2 % — SIGNIFICANT CHANGE UP (ref 0–2)
BILIRUB SERPL-MCNC: 0.5 MG/DL — SIGNIFICANT CHANGE UP (ref 0.2–1.2)
BUN SERPL-MCNC: 8 MG/DL — SIGNIFICANT CHANGE UP (ref 7–23)
CALCIUM SERPL-MCNC: 8.8 MG/DL — SIGNIFICANT CHANGE UP (ref 8.4–10.5)
CHLORIDE SERPL-SCNC: 98 MMOL/L — SIGNIFICANT CHANGE UP (ref 96–108)
CO2 SERPL-SCNC: 21 MMOL/L — LOW (ref 22–31)
CREAT SERPL-MCNC: 0.43 MG/DL — LOW (ref 0.5–1.3)
EOSINOPHIL # BLD AUTO: 0.03 K/UL — SIGNIFICANT CHANGE UP (ref 0–0.5)
EOSINOPHIL NFR BLD AUTO: 0.2 % — SIGNIFICANT CHANGE UP (ref 0–6)
FIBRINOGEN PPP-MCNC: 798 MG/DL — HIGH (ref 350–510)
GLUCOSE SERPL-MCNC: 135 MG/DL — HIGH (ref 70–99)
HCT VFR BLD CALC: 36.7 % — SIGNIFICANT CHANGE UP (ref 34.5–45)
HGB BLD-MCNC: 12.1 G/DL — SIGNIFICANT CHANGE UP (ref 11.5–15.5)
IMM GRANULOCYTES NFR BLD AUTO: 0.5 % — SIGNIFICANT CHANGE UP (ref 0–1.5)
INR BLD: 0.91 RATIO — SIGNIFICANT CHANGE UP (ref 0.88–1.16)
LDH SERPL L TO P-CCNC: 263 U/L — HIGH (ref 50–242)
LYMPHOCYTES # BLD AUTO: 1.48 K/UL — SIGNIFICANT CHANGE UP (ref 1–3.3)
LYMPHOCYTES # BLD AUTO: 9.3 % — LOW (ref 13–44)
MAGNESIUM SERPL-MCNC: 1.8 MG/DL — SIGNIFICANT CHANGE UP (ref 1.6–2.6)
MCHC RBC-ENTMCNC: 28.4 PG — SIGNIFICANT CHANGE UP (ref 27–34)
MCHC RBC-ENTMCNC: 33 GM/DL — SIGNIFICANT CHANGE UP (ref 32–36)
MCV RBC AUTO: 86.2 FL — SIGNIFICANT CHANGE UP (ref 80–100)
MONOCYTES # BLD AUTO: 0.81 K/UL — SIGNIFICANT CHANGE UP (ref 0–0.9)
MONOCYTES NFR BLD AUTO: 5.1 % — SIGNIFICANT CHANGE UP (ref 2–14)
NEUTROPHILS # BLD AUTO: 13.44 K/UL — HIGH (ref 1.8–7.4)
NEUTROPHILS NFR BLD AUTO: 84.7 % — HIGH (ref 43–77)
NRBC # BLD: 0 /100 WBCS — SIGNIFICANT CHANGE UP (ref 0–0)
PHOSPHATE SERPL-MCNC: 4.5 MG/DL — SIGNIFICANT CHANGE UP (ref 2.5–4.5)
PLATELET # BLD AUTO: 178 K/UL — SIGNIFICANT CHANGE UP (ref 150–400)
POTASSIUM SERPL-MCNC: 4.1 MMOL/L — SIGNIFICANT CHANGE UP (ref 3.5–5.3)
POTASSIUM SERPL-SCNC: 4.1 MMOL/L — SIGNIFICANT CHANGE UP (ref 3.5–5.3)
PROT SERPL-MCNC: 6.3 G/DL — SIGNIFICANT CHANGE UP (ref 6–8.3)
PROTHROM AB SERPL-ACNC: 10.4 SEC — SIGNIFICANT CHANGE UP (ref 10–12.9)
RBC # BLD: 4.26 M/UL — SIGNIFICANT CHANGE UP (ref 3.8–5.2)
RBC # FLD: 15.7 % — HIGH (ref 10.3–14.5)
SODIUM SERPL-SCNC: 132 MMOL/L — LOW (ref 135–145)
URATE SERPL-MCNC: 3.9 MG/DL — SIGNIFICANT CHANGE UP (ref 2.5–7)
WBC # BLD: 15.87 K/UL — HIGH (ref 3.8–10.5)
WBC # FLD AUTO: 15.87 K/UL — HIGH (ref 3.8–10.5)

## 2020-05-31 PROCEDURE — 74018 RADEX ABDOMEN 1 VIEW: CPT | Mod: 26

## 2020-05-31 RX ORDER — PANTOPRAZOLE SODIUM 20 MG/1
40 TABLET, DELAYED RELEASE ORAL ONCE
Refills: 0 | Status: COMPLETED | OUTPATIENT
Start: 2020-05-31 | End: 2020-05-31

## 2020-05-31 RX ORDER — ACETAMINOPHEN 500 MG
1000 TABLET ORAL EVERY 6 HOURS
Refills: 0 | Status: COMPLETED | OUTPATIENT
Start: 2020-05-31 | End: 2020-06-01

## 2020-05-31 RX ORDER — ONDANSETRON 8 MG/1
4 TABLET, FILM COATED ORAL ONCE
Refills: 0 | Status: COMPLETED | OUTPATIENT
Start: 2020-05-31 | End: 2020-05-31

## 2020-05-31 RX ORDER — FAMOTIDINE 10 MG/ML
20 INJECTION INTRAVENOUS DAILY
Refills: 0 | Status: DISCONTINUED | OUTPATIENT
Start: 2020-05-31 | End: 2020-05-31

## 2020-05-31 RX ORDER — SODIUM CHLORIDE 9 MG/ML
1000 INJECTION, SOLUTION INTRAVENOUS
Refills: 0 | Status: DISCONTINUED | OUTPATIENT
Start: 2020-05-31 | End: 2020-06-03

## 2020-05-31 RX ORDER — FAMOTIDINE 10 MG/ML
20 INJECTION INTRAVENOUS DAILY
Refills: 0 | Status: DISCONTINUED | OUTPATIENT
Start: 2020-05-31 | End: 2020-06-03

## 2020-05-31 RX ORDER — FAMOTIDINE 10 MG/ML
20 INJECTION INTRAVENOUS ONCE
Refills: 0 | Status: COMPLETED | OUTPATIENT
Start: 2020-05-31 | End: 2020-05-31

## 2020-05-31 RX ADMIN — FAMOTIDINE 20 MILLIGRAM(S): 10 INJECTION INTRAVENOUS at 04:40

## 2020-05-31 RX ADMIN — Medication 975 MILLIGRAM(S): at 03:18

## 2020-05-31 RX ADMIN — HEPARIN SODIUM 10000 UNIT(S): 5000 INJECTION INTRAVENOUS; SUBCUTANEOUS at 17:40

## 2020-05-31 RX ADMIN — OXYCODONE HYDROCHLORIDE 5 MILLIGRAM(S): 5 TABLET ORAL at 05:27

## 2020-05-31 RX ADMIN — SODIUM CHLORIDE 125 MILLILITER(S): 9 INJECTION, SOLUTION INTRAVENOUS at 17:40

## 2020-05-31 RX ADMIN — ONDANSETRON 4 MILLIGRAM(S): 8 TABLET, FILM COATED ORAL at 04:40

## 2020-05-31 RX ADMIN — Medication 400 MILLIGRAM(S): at 11:29

## 2020-05-31 RX ADMIN — Medication 400 MILLIGRAM(S): at 17:40

## 2020-05-31 RX ADMIN — HEPARIN SODIUM 10000 UNIT(S): 5000 INJECTION INTRAVENOUS; SUBCUTANEOUS at 05:27

## 2020-05-31 RX ADMIN — OXYCODONE HYDROCHLORIDE 5 MILLIGRAM(S): 5 TABLET ORAL at 00:57

## 2020-05-31 RX ADMIN — PANTOPRAZOLE SODIUM 40 MILLIGRAM(S): 20 TABLET, DELAYED RELEASE ORAL at 18:16

## 2020-05-31 NOTE — PROGRESS NOTE ADULT - ASSESSMENT
33 yo female POD#3 s/p , EUA, Ex-lap, abdominal hysterectomy, R salpingectomy, R ureterolysis, Cystoscopy for cervical laceration, R lateral wall uterine rupture w/ broad ligament extension. Patient is stable and doing well postoperatively 35 yo female POD#3 s/p , EUA, Ex-lap, abdominal hysterectomy, R salpingectomy, R ureterolysis, Cystoscopy for cervical laceration, R lateral wall uterine rupture w/ broad ligament extension. Patient now experiencing nausea and abdominal distension, concerning for ileus vs SBO

## 2020-05-31 NOTE — PROGRESS NOTE ADULT - SUBJECTIVE AND OBJECTIVE BOX
R3 Postpartum Progress Note - POD #3    Subjective  Patient seen and examined at bedside while pumping, no acute overnight events. No acute complaints, pain well controlled. Patient is ambulating, voiding spontaneously, passing flatus and tolerating regular diet. Denies CP, SOB, nausea, vomiting, fevers, and chills.    Objective   Vital Signs Last 24 Hrs  T(C): 37.1 (31 May 2020 01:00), Max: 37.6 (30 May 2020 15:21)  T(F): 98.8 (31 May 2020 01:00), Max: 99.7 (30 May 2020 15:21)  HR: 92 (31 May 2020 01:00) (92 - 106)  BP: 127/78 (31 May 2020 01:00) (113/81 - 127/78)  BP(mean): --  RR: 18 (31 May 2020 01:00) (16 - 18)  SpO2: 98% (31 May 2020 01:00) (96% - 98%)    05-29 @ 07:01  -  05-30 @ 07:00  --------------------------------------------------------  IN: 400 mL / OUT: 1650 mL / NET: -1250 mL      Physical Exam:  General: NAD  CV: NR, RR, S1, S2, no M/R/G  Lungs: CTA-B  Abdomen: Soft, appropriately tender, softly distended, hypoactive BS  Incision: Midline vertical incision C/D/I  Ext: no edema/tenderness in LE b/l, Negative Carmen's sign bilaterally     Labs:             10.6   17.05 )-----------( 128      ( 05-30 @ 10:01 )             33.0     05-30-20 @ 10:01    134<L>  |  99  |  5<L>             --------------------------< 162<H>     4.0  |  20<L>  | 0.49<L>    eGFR AA: 145  eGFR N-AA: 125    Calcium: 8.7  Phosphorus: --  Magnesium: --    AST: 22    ALT: 15  AlkPhos: 75  Protein: 6.1  Albumin: 3.5  TBili: 0.4  D-Bili: <0.1      MEDICATIONS  (STANDING):  chlorhexidine 2% Cloths 1 Application(s) Topical <User Schedule>  diphtheria/tetanus Vaccine - Adult 0.5 milliLiter(s) IntraMuscular once  famotidine    Tablet 20 milliGRAM(s) Oral daily  famotidine Injectable 20 milliGRAM(s) IV Push once  heparin   Injectable 97477 Unit(s) SubCutaneous every 12 hours  ondansetron    Tablet 4 milliGRAM(s) Oral once  polyethylene glycol 3350 17 Gram(s) Oral daily  prenatal multivitamin 1 Tablet(s) Oral daily  senna 2 Tablet(s) Oral at bedtime    MEDICATIONS  (PRN):  acetaminophen   Tablet .. 975 milliGRAM(s) Oral every 6 hours PRN Mild Pain (1 - 3)  oxyCODONE    IR 5 milliGRAM(s) Oral every 4 hours PRN Moderate Pain (4 - 6)  oxyCODONE    IR 10 milliGRAM(s) Oral every 6 hours PRN Severe Pain (7 - 10) R3 Postpartum Progress Note - POD #3    Subjective  Patient seen and examined at bedside. This morning, she reports worsening acid reflux and nausea. She states that she ate minimal amounts of liquids overnight and did not have any solid food. She still has not passed flatus. Incisional pain well controlled. Patient is ambulating, voiding spontaneously, and tolerating regular diet. Denies CP, SOB, fevers, and chills.    Objective   Vital Signs Last 24 Hrs  T(C): 37.1 (31 May 2020 01:00), Max: 37.6 (30 May 2020 15:21)  T(F): 98.8 (31 May 2020 01:00), Max: 99.7 (30 May 2020 15:21)  HR: 92 (31 May 2020 01:00) (92 - 106)  BP: 127/78 (31 May 2020 01:00) (113/81 - 127/78)  BP(mean): --  RR: 18 (31 May 2020 01:00) (16 - 18)  SpO2: 98% (31 May 2020 01:00) (96% - 98%)    05-29 @ 07:01  -  05-30 @ 07:00  --------------------------------------------------------  IN: 400 mL / OUT: 1650 mL / NET: -1250 mL      Physical Exam:  General: NAD  CV: NR, RR, S1, S2, no M/R/G  Lungs: CTA-B  Abdomen: Soft, appropriately tender, distended and tympanic abdomen, hypoactive BS  Incision: Midline vertical incision C/D/I, steri strips in place  Ext: no edema/tenderness in LE b/l, Negative Carmen's sign bilaterally     Labs:             10.6   17.05 )-----------( 128      ( 05-30 @ 10:01 )             33.0     05-30-20 @ 10:01    134<L>  |  99  |  5<L>             --------------------------< 162<H>     4.0  |  20<L>  | 0.49<L>    eGFR AA: 145  eGFR N-AA: 125    Calcium: 8.7  Phosphorus: --  Magnesium: --    AST: 22    ALT: 15  AlkPhos: 75  Protein: 6.1  Albumin: 3.5  TBili: 0.4  D-Bili: <0.1      MEDICATIONS  (STANDING):  chlorhexidine 2% Cloths 1 Application(s) Topical <User Schedule>  diphtheria/tetanus Vaccine - Adult 0.5 milliLiter(s) IntraMuscular once  famotidine    Tablet 20 milliGRAM(s) Oral daily  famotidine Injectable 20 milliGRAM(s) IV Push once  heparin   Injectable 07334 Unit(s) SubCutaneous every 12 hours  ondansetron    Tablet 4 milliGRAM(s) Oral once  polyethylene glycol 3350 17 Gram(s) Oral daily  prenatal multivitamin 1 Tablet(s) Oral daily  senna 2 Tablet(s) Oral at bedtime    MEDICATIONS  (PRN):  acetaminophen   Tablet .. 975 milliGRAM(s) Oral every 6 hours PRN Mild Pain (1 - 3)  oxyCODONE    IR 5 milliGRAM(s) Oral every 4 hours PRN Moderate Pain (4 - 6)  oxyCODONE    IR 10 milliGRAM(s) Oral every 6 hours PRN Severe Pain (7 - 10)

## 2020-05-31 NOTE — PROGRESS NOTE ADULT - PROBLEM SELECTOR PLAN 1
Neuro: C/w PO pain meds prn.  CV: Hemodynamically stable.  H/H stable.     -Elevated BP upon admission to SICU:  HELLP labs wnl.  BP currently wnl.   Does not met criteria for hypertensive disorder at this time.  Will continue to monitor BP per routine.       -Patient s/p 11uPRBC, 8 FFP, 2 Plt, 1 Cryo, 1 L 5% albumin.       -f/u AM labs  Pulm: Saturating well on room air, encourage oob/amb  GI: Tolerating regular diet; c/w simethicone and senna prn  : Voiding spontaneously   Heme: HSQ, SCDs, ambulation for DVT ppx.   FEN: LR@125.  replete electrolytes prn   ID: Afebrile  Endo: No active issues   Dispo:  C/w postoperative care    THELMA Vega pgy3 CV: Hemodynamically stable.  H/H stable.   Pulm: Saturating well on room air, encourage oob/amb  GI: NPO. XR abdomen to r/o ileus  : Voiding spontaneously   Heme: HSQ, SCDs, ambulation for DVT ppx. Patient s/p 11uPRBC, 8 FFP, 2 Plt, 1 Cryo, 1 L 5% albumin.    FEN: LR@125.  replete electrolytes prn   ID: Afebrile  Endo: No active issues   Neuro: will transition analgesics to IV    THELMA Vega pgy3

## 2020-05-31 NOTE — PROVIDER CONTACT NOTE (CHANGE IN STATUS NOTIFICATION) - ASSESSMENT
pt states that she's nauseous when  she has an abdominal spasm. pt stated that she hasn't passed flatus since delivery, abdomen distended

## 2020-05-31 NOTE — PROGRESS NOTE ADULT - ATTENDING COMMENTS
/Attendiny  s/p  w/ RML c/b cervical laceration with extension into broad ligament and R lateral uterine wall rupture s/p ex-lap, abd hysterectomy, right salpingectomy, right ureterolysis, and cystoscopy. (EBL 3500, s/p 11uPRBCs/8uFFP/2u platelets/1uCryo/1L albumin/7L cystalloid). Pt transferred to SICU intubated postoperatively on  and extubated overnight. Pt currently in SICU extubated and in stable condition.    Pt seen at bedside with the above Resident and I agree with the assessment and plan.  -Pt is currently stable and will continue management as per SICU team.  -BP in mild range; no s/sx of pre-eclampsia at this time; has not required any antihypertensives at this time; consider antihypertensives if persists  -Pain controlled with IV tylenol and Dilaudid.   -Continue routine PP/post-op care from Ob standpoint (remove dressing; remove tobin; encourage ambulation; advance diet as tolerated).  -Continue to monitor labs  -Pt to be seen in the AM by her PVT MD.    Dr. Gutierrez
Att  Pt seen  Resting comfortably, has discomfort from distension, feels nauseous and had emesis twice of green bile  VSS afeb  Abd S, NT, mod distension with tympany, and soft BS, inc c/d/i  33 yo female POD#3 s/p , EUA, Ex-lap, abdominal hysterectomy, R salpingectomy, R ureterolysis, Cystoscopy for cervical laceration, R lateral wall uterine rupture w/ broad ligament extension. Patient now experiencing nausea and abdominal distension, c/w ileus.  Pt had AXR that is c/w ileus.    Reviewed cont ambulation, NPO, await flatus.  D/w pt poss need for NGT if sx's worsen.  Check rpt labs.  OVIDIO Inman

## 2020-06-01 LAB
ALBUMIN SERPL ELPH-MCNC: 3.2 G/DL — LOW (ref 3.3–5)
ALP SERPL-CCNC: 68 U/L — SIGNIFICANT CHANGE UP (ref 40–120)
ALT FLD-CCNC: 22 U/L — SIGNIFICANT CHANGE UP (ref 10–45)
ANION GAP SERPL CALC-SCNC: 12 MMOL/L — SIGNIFICANT CHANGE UP (ref 5–17)
AST SERPL-CCNC: 43 U/L — HIGH (ref 10–40)
BILIRUB SERPL-MCNC: 0.4 MG/DL — SIGNIFICANT CHANGE UP (ref 0.2–1.2)
BUN SERPL-MCNC: 9 MG/DL — SIGNIFICANT CHANGE UP (ref 7–23)
CALCIUM SERPL-MCNC: 8.7 MG/DL — SIGNIFICANT CHANGE UP (ref 8.4–10.5)
CHLORIDE SERPL-SCNC: 103 MMOL/L — SIGNIFICANT CHANGE UP (ref 96–108)
CO2 SERPL-SCNC: 24 MMOL/L — SIGNIFICANT CHANGE UP (ref 22–31)
CREAT SERPL-MCNC: 0.53 MG/DL — SIGNIFICANT CHANGE UP (ref 0.5–1.3)
GLUCOSE SERPL-MCNC: 95 MG/DL — SIGNIFICANT CHANGE UP (ref 70–99)
HCT VFR BLD CALC: 35 % — SIGNIFICANT CHANGE UP (ref 34.5–45)
HGB BLD-MCNC: 11.2 G/DL — LOW (ref 11.5–15.5)
MCHC RBC-ENTMCNC: 28.4 PG — SIGNIFICANT CHANGE UP (ref 27–34)
MCHC RBC-ENTMCNC: 32 GM/DL — SIGNIFICANT CHANGE UP (ref 32–36)
MCV RBC AUTO: 88.6 FL — SIGNIFICANT CHANGE UP (ref 80–100)
NRBC # BLD: 0 /100 WBCS — SIGNIFICANT CHANGE UP (ref 0–0)
PLATELET # BLD AUTO: 207 K/UL — SIGNIFICANT CHANGE UP (ref 150–400)
POTASSIUM SERPL-MCNC: 4.3 MMOL/L — SIGNIFICANT CHANGE UP (ref 3.5–5.3)
POTASSIUM SERPL-SCNC: 4.3 MMOL/L — SIGNIFICANT CHANGE UP (ref 3.5–5.3)
PROT SERPL-MCNC: 5.8 G/DL — LOW (ref 6–8.3)
RBC # BLD: 3.95 M/UL — SIGNIFICANT CHANGE UP (ref 3.8–5.2)
RBC # FLD: 15.3 % — HIGH (ref 10.3–14.5)
SODIUM SERPL-SCNC: 139 MMOL/L — SIGNIFICANT CHANGE UP (ref 135–145)
WBC # BLD: 8.93 K/UL — SIGNIFICANT CHANGE UP (ref 3.8–10.5)
WBC # FLD AUTO: 8.93 K/UL — SIGNIFICANT CHANGE UP (ref 3.8–10.5)

## 2020-06-01 PROCEDURE — 58150 TOTAL HYSTERECTOMY: CPT

## 2020-06-01 RX ADMIN — Medication 1 TABLET(S): at 13:01

## 2020-06-01 RX ADMIN — HEPARIN SODIUM 10000 UNIT(S): 5000 INJECTION INTRAVENOUS; SUBCUTANEOUS at 18:19

## 2020-06-01 RX ADMIN — HEPARIN SODIUM 10000 UNIT(S): 5000 INJECTION INTRAVENOUS; SUBCUTANEOUS at 06:47

## 2020-06-01 RX ADMIN — Medication 400 MILLIGRAM(S): at 01:37

## 2020-06-01 RX ADMIN — FAMOTIDINE 100 MILLIGRAM(S): 10 INJECTION INTRAVENOUS at 06:47

## 2020-06-01 NOTE — PROGRESS NOTE ADULT - SUBJECTIVE AND OBJECTIVE BOX
Postpartum Note,  Section    ATTENDING NOTE Post-operative day 3    Subjective:  The patient feels well.  She is ambulating.   She is tolerating clear fluids  She denies nausea and vomiting.  She is voiding.  Her pain is controlled.  She reports normal postpartum bleeding    Physical exam:    Vital Signs Last 24 Hrs  T(C): 36.8 (2020 12:11), Max: 37.2 (31 May 2020 21:50)  T(F): 98.2 (2020 12:11), Max: 99 (31 May 2020 21:50)  HR: 90 (2020 12:11) (77 - 100)  BP: 120/77 (2020 12:11) (107/73 - 120/77)  BP(mean): --  RR: 18 (2020 12:11) (18 - 19)  SpO2: 98% (2020 12:11) (97% - 98%)    Gen: NAD  Breast: Soft, nontender, not engorged.  Abdomen: Soft, nontender, no distension , firm uterine fundus at umbilicus.  Incision: Clean, dry, and intact  Pelvic: Normal lochia noted  Ext: No calf tenderness    LABS:                        11.2   8.93  )-----------( 207      ( 2020 10:35 )             35.0                         12.1   15.87 )-----------( 178      ( 31 May 2020 11:16 )             36.7       20 @ 10:35      139  |  103  |  9   ----------------------------<  95  4.3   |  24  |  0.53    20 @ 10:01      132<L>  |  98  |  8   ----------------------------<  135<H>  4.1   |  21<L>  |  0.43<L>    20 @ 10:01      134<L>  |  99  |  5<L>  ----------------------------<  162<H>  4.0   |  20<L>  |  0.49<L>    20 @ 16:50      135  |  101  |  5<L>  ----------------------------<  135<H>  3.7   |  22  |  0.42<L>        Ca    8.7      2020 10:35  Ca    8.8      31 May 2020 10:01  Ca    8.7      30 May 2020 10:01  Ca    8.9      29 May 2020 16:50  Phos  4.5       Mg     1.8         TPro  5.8<L>  /  Alb  3.2<L>  /  TBili  0.4  /  DBili  x   /  AST  43<H>  /  ALT  22  /  AlkPhos  68  20 @ 10:35  TPro  6.3  /  Alb  3.3  /  TBili  0.5  /  DBili  x   /  AST  31  /  ALT  17  /  AlkPhos  80  20 @ 10:01  TPro  6.1  /  Alb  3.5  /  TBili  0.4  /  DBili  <0.1  /  AST  22  /  ALT  15  /  AlkPhos  75  20 @ 10:01  TPro  5.9<L>  /  Alb  3.5  /  TBili  0.6  /  DBili  x   /  AST  28  /  ALT  19  /  AlkPhos  69  20 @ 16:50        Allergies    No Known Allergies    Intolerances      MEDICATIONS  (STANDING):  chlorhexidine 2% Cloths 1 Application(s) Topical <User Schedule>  diphtheria/tetanus Vaccine - Adult 0.5 milliLiter(s) IntraMuscular once  famotidine  IVPB 20 milliGRAM(s) IV Intermittent daily  heparin   Injectable 43256 Unit(s) SubCutaneous every 12 hours  lactated ringers. 1000 milliLiter(s) (125 mL/Hr) IV Continuous <Continuous>  polyethylene glycol 3350 17 Gram(s) Oral daily  prenatal multivitamin 1 Tablet(s) Oral daily  senna 2 Tablet(s) Oral at bedtime    MEDICATIONS  (PRN):        Assessment and Plan  POD # 4  s/p  section/HYST  with resolved ileus  Encourage ambulation  Analgesia prn  clerar diet  today  Discharge instructions given  F/U in office in 1 week for incision check

## 2020-06-01 NOTE — PROGRESS NOTE ADULT - PROBLEM SELECTOR PLAN 1
CV: Hemodynamically stable.  H/H stable.   Pulm: Saturating well on room air, encourage oob/amb  GI: NPO. XR abdomen(5/31): dilated loops of bowel concerning for ileus. Pt with improving bowel function, passing flatus with BM. Recommend advancing diet to clears then as tolerated.   : Voiding spontaneously   Heme: HSQ, SCDs, ambulation for DVT ppx. Patient s/p 11uPRBC, 8 FFP, 2 Plt, 1 Cryo, 1 L 5% albumin.    FEN: LR@125.  replete electrolytes prn   ID: Afebrile  Endo: No active issues   Neuro: IV analgesics, transition to PO analgesics if pt tolerates advancing diet    Kbeetham PGY3

## 2020-06-01 NOTE — PROGRESS NOTE ADULT - ASSESSMENT
33 yo female POD#4 s/p , EUA, Ex-lap, abdominal hysterectomy, R salpingectomy, R ureterolysis, Cystoscopy for cervical laceration, R lateral wall uterine rupture w/ broad ligament extension. Patient with ileus POD#3 now improving. Patient has had BM and is passing flatus with improvement of nausea and vomiting.

## 2020-06-01 NOTE — PROGRESS NOTE ADULT - SUBJECTIVE AND OBJECTIVE BOX
POD#4   HD#5    Patient seen and examined at bedside, no acute overnight events. No acute complaints, pain well controlled. Overnight patient passing flatus and had a BM yesterday. Denies nausea/vomiting overnight - last emesis episode was yesterday morning.     Patient is ambulating, passing flatus, voiding spontaneously. Denies CP, SOB, nausea, vomiting, fevers, and chills.    Vital Signs Last 24 Hours  T(C): 36.8 (06-01-20 @ 01:43), Max: 37.2 (05-31-20 @ 21:50)  HR: 92 (06-01-20 @ 01:43) (77 - 100)  BP: 112/74 (06-01-20 @ 01:43) (112/74 - 133/78)  RR: 18 (06-01-20 @ 01:43) (18 - 20)  SpO2: 97% (06-01-20 @ 01:43) (97% - 98%)    I&O's Summary    31 May 2020 07:01  -  01 Jun 2020 05:30  --------------------------------------------------------  IN: 500 mL / OUT: 595 mL / NET: -95 mL        Physical Exam:  General: NAD  CV: NR, RR, S1, S2, no M/R/G  Lungs: CTA-B  Abdomen: Soft, appropriately tender, mildly distended,  hypoactive BS  Incision: Midline vertical incision C/D/I, steri strips in place  Ext: no edema/tenderness in LE b/l, Negative Carmen's sign bilaterally. +edema of LUE     Labs:                        12.1   15.87 )-----------( 178      ( 31 May 2020 11:16 )             36.7   baso 0.2    eos 0.2    imm gran 0.5    lymph 9.3    mono 5.1    poly 84.7                         10.6   17.05 )-----------( 128      ( 30 May 2020 10:01 )             33.0   baso 0.2    eos 0.3    imm gran 0.7    lymph 9.4    mono 4.8    poly 84.6                         11.8   16.34 )-----------( 129      ( 29 May 2020 16:50 )             35.4   baso 0.3    eos 0.2    imm gran 0.6    lymph 14.9   mono 6.2    poly 77.8     05-31    132<L>  |  98  |  8   ----------------------------<  135<H>  4.1   |  21<L>  |  0.43<L>    Ca    8.8      31 May 2020 10:01  Phos  4.5     05-31  Mg     1.8     05-31    TPro  6.3  /  Alb  3.3  /  TBili  0.5  /  DBili  x   /  AST  31  /  ALT  17  /  AlkPhos  80  05-31    PT/INR - ( 31 May 2020 11:16 )   PT: 10.4 sec;   INR: 0.91 ratio         PTT - ( 31 May 2020 11:16 )  PTT:23.8 sec      Blood Type: B Negative      MEDICATIONS  (STANDING):  chlorhexidine 2% Cloths 1 Application(s) Topical <User Schedule>  diphtheria/tetanus Vaccine - Adult 0.5 milliLiter(s) IntraMuscular once  famotidine  IVPB 20 milliGRAM(s) IV Intermittent daily  heparin   Injectable 59203 Unit(s) SubCutaneous every 12 hours  lactated ringers. 1000 milliLiter(s) (125 mL/Hr) IV Continuous <Continuous>  polyethylene glycol 3350 17 Gram(s) Oral daily  prenatal multivitamin 1 Tablet(s) Oral daily  senna 2 Tablet(s) Oral at bedtime    MEDICATIONS  (PRN):

## 2020-06-02 LAB
ALBUMIN SERPL ELPH-MCNC: 3.3 G/DL — SIGNIFICANT CHANGE UP (ref 3.3–5)
ALP SERPL-CCNC: 72 U/L — SIGNIFICANT CHANGE UP (ref 40–120)
ALT FLD-CCNC: 23 U/L — SIGNIFICANT CHANGE UP (ref 10–45)
ANION GAP SERPL CALC-SCNC: 12 MMOL/L — SIGNIFICANT CHANGE UP (ref 5–17)
AST SERPL-CCNC: 34 U/L — SIGNIFICANT CHANGE UP (ref 10–40)
BILIRUB SERPL-MCNC: 0.4 MG/DL — SIGNIFICANT CHANGE UP (ref 0.2–1.2)
BUN SERPL-MCNC: 6 MG/DL — LOW (ref 7–23)
CALCIUM SERPL-MCNC: 8.8 MG/DL — SIGNIFICANT CHANGE UP (ref 8.4–10.5)
CHLORIDE SERPL-SCNC: 102 MMOL/L — SIGNIFICANT CHANGE UP (ref 96–108)
CO2 SERPL-SCNC: 23 MMOL/L — SIGNIFICANT CHANGE UP (ref 22–31)
CREAT SERPL-MCNC: 0.56 MG/DL — SIGNIFICANT CHANGE UP (ref 0.5–1.3)
GLUCOSE SERPL-MCNC: 118 MG/DL — HIGH (ref 70–99)
MAGNESIUM SERPL-MCNC: 1.8 MG/DL — SIGNIFICANT CHANGE UP (ref 1.6–2.6)
MAGNESIUM SERPL-MCNC: 1.9 MG/DL — SIGNIFICANT CHANGE UP (ref 1.6–2.6)
PHOSPHATE SERPL-MCNC: 3.1 MG/DL — SIGNIFICANT CHANGE UP (ref 2.5–4.5)
PHOSPHATE SERPL-MCNC: 3.3 MG/DL — SIGNIFICANT CHANGE UP (ref 2.5–4.5)
POTASSIUM SERPL-MCNC: 3.6 MMOL/L — SIGNIFICANT CHANGE UP (ref 3.5–5.3)
POTASSIUM SERPL-SCNC: 3.6 MMOL/L — SIGNIFICANT CHANGE UP (ref 3.5–5.3)
PROT SERPL-MCNC: 5.8 G/DL — LOW (ref 6–8.3)
SODIUM SERPL-SCNC: 137 MMOL/L — SIGNIFICANT CHANGE UP (ref 135–145)

## 2020-06-02 RX ORDER — BACITRACIN ZINC 500 UNIT/G
1 OINTMENT IN PACKET (EA) TOPICAL ONCE
Refills: 0 | Status: COMPLETED | OUTPATIENT
Start: 2020-06-02 | End: 2020-06-03

## 2020-06-02 RX ADMIN — HEPARIN SODIUM 10000 UNIT(S): 5000 INJECTION INTRAVENOUS; SUBCUTANEOUS at 06:05

## 2020-06-02 RX ADMIN — Medication 1 TABLET(S): at 12:12

## 2020-06-02 RX ADMIN — SENNA PLUS 2 TABLET(S): 8.6 TABLET ORAL at 21:40

## 2020-06-02 RX ADMIN — HEPARIN SODIUM 10000 UNIT(S): 5000 INJECTION INTRAVENOUS; SUBCUTANEOUS at 17:32

## 2020-06-02 NOTE — PROGRESS NOTE ADULT - SUBJECTIVE AND OBJECTIVE BOX
POD#5   HD#6    Patient seen and examined at bedside, no acute overnight events. No acute complaints, pain well controlled.  Patient is ambulating, passing flatus, voiding spontaneously, and tolerating clear diet. Denies CP, SOB, nausea, vomiting, fevers, and chills.    Vital Signs Last 24 Hours  T(C): 37 (06-02-20 @ 01:00), Max: 37.2 (06-01-20 @ 21:38)  HR: 78 (06-02-20 @ 01:00) (78 - 90)  BP: 100/67 (06-02-20 @ 01:00) (100/67 - 138/80)  RR: 18 (06-02-20 @ 01:00) (18 - 18)  SpO2: 97% (06-02-20 @ 01:00) (97% - 99%)    I&O's Summary    31 May 2020 07:01  -  01 Jun 2020 07:00  --------------------------------------------------------  IN: 500 mL / OUT: 595 mL / NET: -95 mL      Physical Exam:  General: NAD  CV: NR, RR, S1, S2, no M/R/G  Lungs: CTA-B  Abdomen: Soft, appropriately tender, mildly distended,  +BS  Incision: Midline vertical incision C/D/I, steri strips in place  Ext: no edema/tenderness in LE b/l, Negative Carmen's sign bilaterally. +edema of LUE       Labs:                        11.2   8.93  )-----------( 207      ( 01 Jun 2020 10:35 )             35.0   baso x      eos x      imm gran x      lymph x      mono x      poly x                            12.1   15.87 )-----------( 178      ( 31 May 2020 11:16 )             36.7   baso 0.2    eos 0.2    imm gran 0.5    lymph 9.3    mono 5.1    poly 84.7                         10.6   17.05 )-----------( 128      ( 30 May 2020 10:01 )             33.0   baso 0.2    eos 0.3    imm gran 0.7    lymph 9.4    mono 4.8    poly 84.6     06-01    139  |  103  |  9   ----------------------------<  95  4.3   |  24  |  0.53    Ca    8.7      01 Jun 2020 10:35  Phos  4.5     05-31  Mg     1.8     05-31    TPro  5.8<L>  /  Alb  3.2<L>  /  TBili  0.4  /  DBili  x   /  AST  43<H>  /  ALT  22  /  AlkPhos  68  06-01    PT/INR - ( 31 May 2020 11:16 )   PT: 10.4 sec;   INR: 0.91 ratio         PTT - ( 31 May 2020 11:16 )  PTT:23.8 sec      Blood Type: B Negative      MEDICATIONS  (STANDING):  diphtheria/tetanus Vaccine - Adult 0.5 milliLiter(s) IntraMuscular once  famotidine  IVPB 20 milliGRAM(s) IV Intermittent daily  heparin   Injectable 35268 Unit(s) SubCutaneous every 12 hours  lactated ringers. 1000 milliLiter(s) (125 mL/Hr) IV Continuous <Continuous>  polyethylene glycol 3350 17 Gram(s) Oral daily  prenatal multivitamin 1 Tablet(s) Oral daily  senna 2 Tablet(s) Oral at bedtime    MEDICATIONS  (PRN): POD#5   HD#6    Patient seen and examined at bedside, no acute overnight events. No acute complaints, pain well controlled.  Patient is ambulating, passing flatus, voiding spontaneously, and tolerating clear diet. Denies CP, SOB, nausea, vomiting, fevers, and chills.    Vital Signs Last 24 Hours  T(C): 37 (06-02-20 @ 01:00), Max: 37.2 (06-01-20 @ 21:38)  HR: 78 (06-02-20 @ 01:00) (78 - 90)  BP: 100/67 (06-02-20 @ 01:00) (100/67 - 138/80)  RR: 18 (06-02-20 @ 01:00) (18 - 18)  SpO2: 97% (06-02-20 @ 01:00) (97% - 99%)    I&O's Summary    31 May 2020 07:01  -  01 Jun 2020 07:00  --------------------------------------------------------  IN: 500 mL / OUT: 595 mL / NET: -95 mL      Physical Exam:  General: NAD  CV: NR, RR, S1, S2, no M/R/G  Lungs: CTA-B  Abdomen: Soft, appropriately tender, mildly distended,  + minimal BS  Incision: Midline vertical incision C/D/I, steri strips in place  Ext: no edema/tenderness in LE b/l, +edema of LUE       Labs:                        11.2   8.93  )-----------( 207      ( 01 Jun 2020 10:35 )	             35.0   baso x      eos x      imm gran x      lymph x      mono x      poly x                            12.1   15.87 )-----------( 178      ( 31 May 2020 11:16 )             36.7   baso 0.2    eos 0.2    imm gran 0.5    lymph 9.3    mono 5.1    poly 84.7                         10.6   17.05 )-----------( 128      ( 30 May 2020 10:01 )             33.0   baso 0.2    eos 0.3    imm gran 0.7    lymph 9.4    mono 4.8    poly 84.6     06-01    139  |  103  |  9   ----------------------------<  95  4.3   |  24  |  0.53    Ca    8.7      01 Jun 2020 10:35  Phos  4.5     05-31  Mg     1.8     05-31    TPro  5.8<L>  /  Alb  3.2<L>  /  TBili  0.4  /  DBili  x   /  AST  43<H>  /  ALT  22  /  AlkPhos  68  06-01    PT/INR - ( 31 May 2020 11:16 )   PT: 10.4 sec;   INR: 0.91 ratio         PTT - ( 31 May 2020 11:16 )  PTT:23.8 sec      Blood Type: B Negative      MEDICATIONS  (STANDING):  diphtheria/tetanus Vaccine - Adult 0.5 milliLiter(s) IntraMuscular once  famotidine  IVPB 20 milliGRAM(s) IV Intermittent daily  heparin   Injectable 07011 Unit(s) SubCutaneous every 12 hours  lactated ringers. 1000 milliLiter(s) (125 mL/Hr) IV Continuous <Continuous>  polyethylene glycol 3350 17 Gram(s) Oral daily  prenatal multivitamin 1 Tablet(s) Oral daily  senna 2 Tablet(s) Oral at bedtime    MEDICATIONS  (PRN):

## 2020-06-02 NOTE — PROGRESS NOTE ADULT - ASSESSMENT
33 yo female POD#5 s/p , EUA, Ex-lap, abdominal hysterectomy, R salpingectomy, R ureterolysis, Cystoscopy for cervical laceration, R lateral wall uterine rupture w/ broad ligament extension. Patient with ileus POD#3 now improving and tolerating clear diet.

## 2020-06-02 NOTE — PROGRESS NOTE ADULT - PROBLEM SELECTOR PLAN 1
CV: Hemodynamically stable.  H/H stable.   Pulm: Saturating well on room air, encourage oob/amb  GI: Clear diet, ADAT.  Mild transaminitis noted POD#4, AST/ALT 43/22, trend LFT with AM labs. Postop ileus POD#3, improving. XR abdomen(5/31): dilated loops of bowel concerning for ileus.   : Voiding spontaneously   Heme: HSQ, SCDs, ambulation for DVT ppx. Patient s/p 11uPRBC, 8 FFP, 2 Plt, 1 Cryo, 1 L 5% albumin.    FEN: CLD, SLIV, replete electrolytes prn   ID: Afebrile  Endo: No active issues   Neuro:PO analgesics     Kbeetham PGY3.

## 2020-06-02 NOTE — PROGRESS NOTE ADULT - SUBJECTIVE AND OBJECTIVE BOX
Patient seen and evaluated.    Sitting up and pumping.   bp's nl    passing flatus but appetite still poor    incision slightly erythematous from steri sensitivity  abd soft nontender with +bs    lochia minimal    plan ambulation   diet to be advanced when pt has resumed appetite

## 2020-06-03 ENCOUNTER — TRANSCRIPTION ENCOUNTER (OUTPATIENT)
Age: 35
End: 2020-06-03

## 2020-06-03 VITALS
HEART RATE: 83 BPM | SYSTOLIC BLOOD PRESSURE: 116 MMHG | RESPIRATION RATE: 18 BRPM | DIASTOLIC BLOOD PRESSURE: 76 MMHG | OXYGEN SATURATION: 96 % | TEMPERATURE: 98 F

## 2020-06-03 LAB
ALBUMIN SERPL ELPH-MCNC: 3.5 G/DL — SIGNIFICANT CHANGE UP (ref 3.3–5)
ALP SERPL-CCNC: 76 U/L — SIGNIFICANT CHANGE UP (ref 40–120)
ALT FLD-CCNC: 26 U/L — SIGNIFICANT CHANGE UP (ref 10–45)
ANION GAP SERPL CALC-SCNC: 13 MMOL/L — SIGNIFICANT CHANGE UP (ref 5–17)
AST SERPL-CCNC: 28 U/L — SIGNIFICANT CHANGE UP (ref 10–40)
BILIRUB SERPL-MCNC: 0.4 MG/DL — SIGNIFICANT CHANGE UP (ref 0.2–1.2)
BUN SERPL-MCNC: 6 MG/DL — LOW (ref 7–23)
CALCIUM SERPL-MCNC: 8.8 MG/DL — SIGNIFICANT CHANGE UP (ref 8.4–10.5)
CHLORIDE SERPL-SCNC: 103 MMOL/L — SIGNIFICANT CHANGE UP (ref 96–108)
CO2 SERPL-SCNC: 21 MMOL/L — LOW (ref 22–31)
CREAT SERPL-MCNC: 0.54 MG/DL — SIGNIFICANT CHANGE UP (ref 0.5–1.3)
GLUCOSE SERPL-MCNC: 106 MG/DL — HIGH (ref 70–99)
MAGNESIUM SERPL-MCNC: 1.8 MG/DL — SIGNIFICANT CHANGE UP (ref 1.6–2.6)
PHOSPHATE SERPL-MCNC: 3.8 MG/DL — SIGNIFICANT CHANGE UP (ref 2.5–4.5)
POTASSIUM SERPL-MCNC: 3.9 MMOL/L — SIGNIFICANT CHANGE UP (ref 3.5–5.3)
POTASSIUM SERPL-SCNC: 3.9 MMOL/L — SIGNIFICANT CHANGE UP (ref 3.5–5.3)
PROT SERPL-MCNC: 6.3 G/DL — SIGNIFICANT CHANGE UP (ref 6–8.3)
SODIUM SERPL-SCNC: 137 MMOL/L — SIGNIFICANT CHANGE UP (ref 135–145)

## 2020-06-03 PROCEDURE — 85014 HEMATOCRIT: CPT

## 2020-06-03 PROCEDURE — 59050 FETAL MONITOR W/REPORT: CPT

## 2020-06-03 PROCEDURE — 82962 GLUCOSE BLOOD TEST: CPT

## 2020-06-03 PROCEDURE — C1889: CPT

## 2020-06-03 PROCEDURE — 85396 CLOTTING ASSAY WHOLE BLOOD: CPT

## 2020-06-03 PROCEDURE — 86850 RBC ANTIBODY SCREEN: CPT

## 2020-06-03 PROCEDURE — P9012: CPT

## 2020-06-03 PROCEDURE — 85730 THROMBOPLASTIN TIME PARTIAL: CPT

## 2020-06-03 PROCEDURE — 86901 BLOOD TYPING SEROLOGIC RH(D): CPT

## 2020-06-03 PROCEDURE — 71045 X-RAY EXAM CHEST 1 VIEW: CPT

## 2020-06-03 PROCEDURE — 84100 ASSAY OF PHOSPHORUS: CPT

## 2020-06-03 PROCEDURE — 82803 BLOOD GASES ANY COMBINATION: CPT

## 2020-06-03 PROCEDURE — P9011: CPT

## 2020-06-03 PROCEDURE — 85027 COMPLETE CBC AUTOMATED: CPT

## 2020-06-03 PROCEDURE — 86923 COMPATIBILITY TEST ELECTRIC: CPT

## 2020-06-03 PROCEDURE — 85384 FIBRINOGEN ACTIVITY: CPT

## 2020-06-03 PROCEDURE — 36430 TRANSFUSION BLD/BLD COMPNT: CPT

## 2020-06-03 PROCEDURE — P9017: CPT

## 2020-06-03 PROCEDURE — 85460 HEMOGLOBIN FETAL: CPT

## 2020-06-03 PROCEDURE — 85610 PROTHROMBIN TIME: CPT

## 2020-06-03 PROCEDURE — P9041: CPT

## 2020-06-03 PROCEDURE — 82330 ASSAY OF CALCIUM: CPT

## 2020-06-03 PROCEDURE — 88307 TISSUE EXAM BY PATHOLOGIST: CPT

## 2020-06-03 PROCEDURE — 86870 RBC ANTIBODY IDENTIFICATION: CPT

## 2020-06-03 PROCEDURE — 83615 LACTATE (LD) (LDH) ENZYME: CPT

## 2020-06-03 PROCEDURE — P9059: CPT

## 2020-06-03 PROCEDURE — 97166 OT EVAL MOD COMPLEX 45 MIN: CPT

## 2020-06-03 PROCEDURE — 80076 HEPATIC FUNCTION PANEL: CPT

## 2020-06-03 PROCEDURE — 80053 COMPREHEN METABOLIC PANEL: CPT

## 2020-06-03 PROCEDURE — 94002 VENT MGMT INPAT INIT DAY: CPT

## 2020-06-03 PROCEDURE — 80048 BASIC METABOLIC PNL TOTAL CA: CPT

## 2020-06-03 PROCEDURE — 74018 RADEX ABDOMEN 1 VIEW: CPT

## 2020-06-03 PROCEDURE — 83605 ASSAY OF LACTIC ACID: CPT

## 2020-06-03 PROCEDURE — 88302 TISSUE EXAM BY PATHOLOGIST: CPT

## 2020-06-03 PROCEDURE — 97162 PT EVAL MOD COMPLEX 30 MIN: CPT

## 2020-06-03 PROCEDURE — 82947 ASSAY GLUCOSE BLOOD QUANT: CPT

## 2020-06-03 PROCEDURE — P9016: CPT

## 2020-06-03 PROCEDURE — 84295 ASSAY OF SERUM SODIUM: CPT

## 2020-06-03 PROCEDURE — 59025 FETAL NON-STRESS TEST: CPT

## 2020-06-03 PROCEDURE — P9037: CPT

## 2020-06-03 PROCEDURE — 83735 ASSAY OF MAGNESIUM: CPT

## 2020-06-03 PROCEDURE — 82435 ASSAY OF BLOOD CHLORIDE: CPT

## 2020-06-03 PROCEDURE — 86900 BLOOD TYPING SEROLOGIC ABO: CPT

## 2020-06-03 PROCEDURE — 82565 ASSAY OF CREATININE: CPT

## 2020-06-03 PROCEDURE — 86780 TREPONEMA PALLIDUM: CPT

## 2020-06-03 PROCEDURE — 84132 ASSAY OF SERUM POTASSIUM: CPT

## 2020-06-03 PROCEDURE — 84550 ASSAY OF BLOOD/URIC ACID: CPT

## 2020-06-03 PROCEDURE — 88305 TISSUE EXAM BY PATHOLOGIST: CPT

## 2020-06-03 RX ADMIN — HEPARIN SODIUM 10000 UNIT(S): 5000 INJECTION INTRAVENOUS; SUBCUTANEOUS at 06:30

## 2020-06-03 RX ADMIN — Medication 1 APPLICATION(S): at 00:01

## 2020-06-03 NOTE — PROGRESS NOTE ADULT - SUBJECTIVE AND OBJECTIVE BOX
POD#6   HD#7    Patient seen and examined at bedside, no acute overnight events. No acute complaints, pain well controlled.  Patient is ambulating, passing flatus, voiding spontaneously, and tolerating regular diet. Denies CP, SOB, nausea, vomiting, fevers, and chills.    Vital Signs Last 24 Hours  T(C): 37.2 (06-03-20 @ 00:03), Max: 37.4 (06-02-20 @ 08:24)  HR: 82 (06-03-20 @ 00:03) (74 - 86)  BP: 121/83 (06-03-20 @ 00:03) (112/72 - 140/82)  RR: 18 (06-03-20 @ 00:03) (18 - 18)  SpO2: 98% (06-03-20 @ 00:03) (97% - 99%)    I&O's Summary    Physical Exam:  General: NAD  CV: NR, RR, S1, S2, no M/R/G  Lungs: CTA-B  Abdomen: Soft, non-tender, non distended,  +BS  Incision: Midline vertical incision C/D/I, steri strips in place  Ext: no edema/tenderness in LE b/l    Labs:                        11.2   8.93  )-----------( 207      ( 01 Jun 2020 10:35 )             35.0   baso x      eos x      imm gran x      lymph x      mono x      poly x                            12.1   15.87 )-----------( 178      ( 31 May 2020 11:16 )             36.7   baso 0.2    eos 0.2    imm gran 0.5    lymph 9.3    mono 5.1    poly 84.7     06-02    137  |  102  |  6<L>  ----------------------------<  118<H>  3.6   |  23  |  0.56    Ca    8.8      02 Jun 2020 07:01  Phos  3.3     06-02  Mg     1.9     06-02    TPro  5.8<L>  /  Alb  3.3  /  TBili  0.4  /  DBili  x   /  AST  34  /  ALT  23  /  AlkPhos  72  06-02          Blood Type: B Negative      MEDICATIONS  (STANDING):  diphtheria/tetanus Vaccine - Adult 0.5 milliLiter(s) IntraMuscular once  famotidine  IVPB 20 milliGRAM(s) IV Intermittent daily  heparin   Injectable 80797 Unit(s) SubCutaneous every 12 hours  lactated ringers. 1000 milliLiter(s) (125 mL/Hr) IV Continuous <Continuous>  polyethylene glycol 3350 17 Gram(s) Oral daily  prenatal multivitamin 1 Tablet(s) Oral daily  senna 2 Tablet(s) Oral at bedtime    MEDICATIONS  (PRN):

## 2020-06-03 NOTE — PROGRESS NOTE ADULT - PROBLEM SELECTOR PLAN 1
CV: Hemodynamically stable.  H/H stable.   Pulm: Saturating well on room air, encourage oob/amb  GI: Regular diet.  Mild transaminitis noted POD#4, AST/ALT 43/22->34/23. Postop ileus POD#3, improved. XR abdomen(5/31): dilated loops of bowel concerning for ileus.   : Voiding spontaneously   Heme: HSQ, SCDs, ambulation for DVT ppx. Patient s/p 11uPRBC, 8 FFP, 2 Plt, 1 Cryo, 1 L 5% albumin.    FEN: CLD, SLIV, replete electrolytes prn   ID: Afebrile  Endo: No active issues   Neuro:PO analgesics     Kbeetham PGY3.

## 2020-06-03 NOTE — DISCHARGE NOTE OB - CARE PLAN
Principal Discharge DX:	Postoperative state  Goal:	post op care, postpartum care  Assessment and plan of treatment:	fu 2 weeks, reg diet, mod activity

## 2020-06-03 NOTE — DISCHARGE NOTE OB - HOSPITAL COURSE
pt admitted for induction for GDMA2 at term,  with uterine rupture leading to emergent exploratory laparotomy/midline incision, TAM, massive transfusion protocol and admission to SICU.  She recovered well with transfer to postpartum floor day 2, hct stable, mild ileus day 3, resolved, pt doing well, voiding, tolerating reg diet, no pain, desiring discharge home

## 2020-06-03 NOTE — DISCHARGE NOTE OB - CARE PROVIDER_API CALL
Theodore Wolf  OBSTETRICS AND GYNECOLOGY  [address] [place] [address]  Phone: [phone]  Fax: [phone]  Follow Up Time:

## 2020-06-03 NOTE — DISCHARGE NOTE OB - PATIENT PORTAL LINK FT
You can access the FollowMyHealth Patient Portal offered by [facility] by registering at the following website: [email or web address]. By joining [email or web address]’s FollowMyHealth portal, you will also be able to view your health information using other applications (apps) compatible with our system.

## 2020-06-03 NOTE — PROGRESS NOTE ADULT - ASSESSMENT
35 yo female POD#6 s/p , EUA, Ex-lap, abdominal hysterectomy, R salpingectomy, R ureterolysis, Cystoscopy for cervical laceration, R lateral wall uterine rupture w/ broad ligament extension. Patient with ileus POD#3 now improving and tolerating regular diet.

## 2020-06-05 LAB — SURGICAL PATHOLOGY STUDY: SIGNIFICANT CHANGE UP

## 2020-08-14 NOTE — ED PROVIDER NOTE - MUSCULOSKELETAL, MLM
Called patient and informed about recommendations. Patient stated understanding and had no questions or concerns at this time.      Spine appears normal, range of motion is not limited, no muscle or joint tenderness

## 2021-07-30 ENCOUNTER — RESULT REVIEW (OUTPATIENT)
Age: 36
End: 2021-07-30

## 2023-10-26 NOTE — DISCHARGE NOTE OB - THE PATIENT HAS
no difficulties Mohs Rapid Report Verbiage: The area of clinically evident tumor was marked with skin marking ink and appropriately hatched.  The initial incision was made following the Mohs approach through the skin.  The specimen was taken to the lab, divided into the necessary number of pieces, chromacoded and processed according to the Mohs protocol.  This was repeated in successive stages until a tumor free defect was achieved.

## 2024-04-12 NOTE — CONSULT NOTE ADULT - SUBJECTIVE AND OBJECTIVE BOX
CC: 34y old Female admitted now s/p vaginal delivery     HPI:  33 y/o now s/p vaginal delivery. Surgery consulted due to hemorrhage post-delivery. Per OB during bimanual exam there was concern that posterior vaginal wall was perforated as examiner could feel into posterior cul-de sac. Patient taken to the OR emergently in attempt to repair posterior tear transvaginally. General surgery on standby for possible ex. lap.     PMHx: Ectopic pregnancy  No pertinent past medical history    PSHx: No significant past surgical history    Medications (inpatient): citric acid/sodium citrate Solution 15 milliLiter(s) Oral every 6 hours  dextrose 5% + sodium chloride 0.9%. 1000 milliLiter(s) IV Continuous <Continuous>  lactated ringers. 1000 milliLiter(s) IV Continuous <Continuous>  oxytocin Infusion 333.333 milliUNIT(s)/Min IV Continuous <Continuous>  oxytocin Infusion 4 milliUNIT(s)/Min IV Continuous <Continuous>  sodium chloride 0.9%. 1000 milliLiter(s) IV Continuous <Continuous>    Medications (PRN):  Allergies: No Known Allergies  (Intolerances: )  Social Hx:   Family Hx: No pertinent family history in first degree relatives      Physical Exam  T(C): --  HR: --  BP: --  RR: --  SpO2: --  Tmax: T(C): --    General: well developed, well nourished, NAD  Neuro: alert and oriented, no focal deficits, moves all extremities spontaneously  HEENT: NCAT, EOMI, anicteric, mucosa moist  Respiratory: airway patent, respirations unlabored  CVS: regular rate and rhythm  Abdomen: soft, nontender, nondistended  Extremities: no edema, sensation and movement grossly intact  Skin: warm, dry, appropriate color    Labs:                        11.2   38.36 )-----------( 278      ( 28 May 2020 13:48 )             36.2       05-28    135  |  102  |  12  ----------------------------<  372<H>  3.8   |  <10<LL>  |  0.85    Ca    8.4      28 May 2020 13:48    TPro  5.6<L>  /  Alb  3.2<L>  /  TBili  0.2  /  DBili  x   /  AST  17  /  ALT  15  /  AlkPhos  139<H>  05-28      ABG - ( 28 May 2020 14:10 )  pH, Arterial: 7.10  pH, Blood: x     /  pCO2: 42    /  pO2: 270   / HCO3: 12    / Base Excess: -16.5 /  SaO2: 99 [place]

## 2024-09-03 NOTE — PHYSICAL THERAPY INITIAL EVALUATION ADULT - DIAGNOSIS, PT EVAL
previous_has_had_botox When Was Your Last Botox Treatment?: 03/11/2024 Decreased functional mobility d/t pain, impaired strength and endurance.
